# Patient Record
Sex: MALE | Race: WHITE | Employment: OTHER | URBAN - NONMETROPOLITAN AREA
[De-identification: names, ages, dates, MRNs, and addresses within clinical notes are randomized per-mention and may not be internally consistent; named-entity substitution may affect disease eponyms.]

---

## 2017-02-06 LAB — HBA1C MFR BLD: 7.7 %

## 2017-02-07 LAB
ALBUMIN SERPL-MCNC: 3.3 G/DL
ALP BLD-CCNC: 58 U/L
ALT SERPL-CCNC: 25 U/L
AST SERPL-CCNC: 20 U/L
BASOPHILS ABSOLUTE: ABNORMAL /ΜL
BASOPHILS RELATIVE PERCENT: ABNORMAL %
BILIRUB SERPL-MCNC: 0.7 MG/DL (ref 0.1–1.4)
BUN BLDV-MCNC: 18 MG/DL
CALCIUM SERPL-MCNC: NORMAL MG/DL
CHLORIDE BLD-SCNC: 102 MMOL/L
CHOLESTEROL, TOTAL: 96 MG/DL
CHOLESTEROL/HDL RATIO: NORMAL
CO2: NORMAL MMOL/L
CREAT SERPL-MCNC: 1.2 MG/DL
EOSINOPHILS ABSOLUTE: ABNORMAL /ΜL
EOSINOPHILS RELATIVE PERCENT: ABNORMAL %
GFR CALCULATED: NORMAL
GLUCOSE BLD-MCNC: 180 MG/DL
HCT VFR BLD CALC: 35.8 % (ref 41–53)
HDLC SERPL-MCNC: 52 MG/DL (ref 35–70)
HEMOGLOBIN: 12.2 G/DL (ref 13.5–17.5)
LDL CHOLESTEROL CALCULATED: 34 MG/DL (ref 0–160)
LYMPHOCYTES ABSOLUTE: ABNORMAL /ΜL
LYMPHOCYTES RELATIVE PERCENT: ABNORMAL %
MCH RBC QN AUTO: ABNORMAL PG
MCHC RBC AUTO-ENTMCNC: ABNORMAL G/DL
MCV RBC AUTO: ABNORMAL FL
MONOCYTES ABSOLUTE: ABNORMAL /ΜL
MONOCYTES RELATIVE PERCENT: ABNORMAL %
NEUTROPHILS ABSOLUTE: ABNORMAL /ΜL
NEUTROPHILS RELATIVE PERCENT: ABNORMAL %
PLATELET # BLD: 171 K/ΜL
PMV BLD AUTO: ABNORMAL FL
POTASSIUM SERPL-SCNC: 4.8 MMOL/L
RBC # BLD: 3.68 10^6/ΜL
SODIUM BLD-SCNC: 137 MMOL/L
TOTAL PROTEIN: NORMAL
TRIGL SERPL-MCNC: 52 MG/DL
TSH SERPL DL<=0.05 MIU/L-ACNC: 2.11 UIU/ML
VLDLC SERPL CALC-MCNC: NORMAL MG/DL
WBC # BLD: 7.1 10^3/ML

## 2017-03-27 ENCOUNTER — OFFICE VISIT (OUTPATIENT)
Dept: CARDIOLOGY CLINIC | Age: 72
End: 2017-03-27

## 2017-03-27 VITALS
HEIGHT: 67 IN | BODY MASS INDEX: 35.47 KG/M2 | DIASTOLIC BLOOD PRESSURE: 58 MMHG | HEART RATE: 64 BPM | WEIGHT: 226 LBS | RESPIRATION RATE: 16 BRPM | SYSTOLIC BLOOD PRESSURE: 120 MMHG

## 2017-03-27 DIAGNOSIS — J98.6 DIAPHRAGM PARALYSIS: ICD-10-CM

## 2017-03-27 DIAGNOSIS — Z95.1 S/P CABG (CORONARY ARTERY BYPASS GRAFT): Primary | ICD-10-CM

## 2017-03-27 DIAGNOSIS — E11.40 TYPE 2 DIABETES MELLITUS WITH DIABETIC NEUROPATHY, WITH LONG-TERM CURRENT USE OF INSULIN (HCC): ICD-10-CM

## 2017-03-27 DIAGNOSIS — Z79.4 TYPE 2 DIABETES MELLITUS WITH DIABETIC NEUROPATHY, WITH LONG-TERM CURRENT USE OF INSULIN (HCC): ICD-10-CM

## 2017-03-27 DIAGNOSIS — E78.2 MIXED HYPERLIPIDEMIA: ICD-10-CM

## 2017-03-27 DIAGNOSIS — I10 ESSENTIAL HYPERTENSION: ICD-10-CM

## 2017-03-27 PROCEDURE — 99214 OFFICE O/P EST MOD 30 MIN: CPT | Performed by: INTERNAL MEDICINE

## 2017-03-27 RX ORDER — CARVEDILOL 12.5 MG/1
12.5 TABLET ORAL 2 TIMES DAILY WITH MEALS
Qty: 180 TABLET | Refills: 3 | Status: SHIPPED | OUTPATIENT
Start: 2017-03-27 | End: 2018-04-02 | Stop reason: SDUPTHER

## 2017-03-27 RX ORDER — SPIRONOLACTONE 25 MG
TABLET ORAL
COMMUNITY

## 2017-03-27 RX ORDER — IBUPROFEN 200 MG
400 TABLET ORAL PRN
COMMUNITY
End: 2017-10-02 | Stop reason: ALTCHOICE

## 2017-03-27 RX ORDER — M-VIT,TX,IRON,MINS/CALC/FOLIC 27MG-0.4MG
1 TABLET ORAL DAILY
COMMUNITY

## 2017-03-30 ENCOUNTER — OFFICE VISIT (OUTPATIENT)
Dept: PULMONOLOGY | Age: 72
End: 2017-03-30

## 2017-03-30 VITALS
DIASTOLIC BLOOD PRESSURE: 50 MMHG | WEIGHT: 224 LBS | BODY MASS INDEX: 35.16 KG/M2 | HEART RATE: 62 BPM | OXYGEN SATURATION: 97 % | SYSTOLIC BLOOD PRESSURE: 120 MMHG | HEIGHT: 67 IN | RESPIRATION RATE: 18 BRPM

## 2017-03-30 DIAGNOSIS — G47.30 SLEEP APNEA, UNSPECIFIED TYPE: ICD-10-CM

## 2017-03-30 DIAGNOSIS — J44.9 COPD, MILD (HCC): Primary | ICD-10-CM

## 2017-03-30 DIAGNOSIS — J98.6 DIAPHRAGM PARALYSIS: ICD-10-CM

## 2017-03-30 PROCEDURE — 99213 OFFICE O/P EST LOW 20 MIN: CPT | Performed by: INTERNAL MEDICINE

## 2017-05-08 ENCOUNTER — NURSE ONLY (OUTPATIENT)
Dept: PULMONOLOGY | Age: 72
End: 2017-05-08

## 2017-05-08 DIAGNOSIS — J44.9 COPD, MILD (HCC): ICD-10-CM

## 2017-05-08 DIAGNOSIS — J98.6 DIAPHRAGM PARALYSIS: ICD-10-CM

## 2017-05-08 LAB
DLCO %PRED: NORMAL
DLCO PRE: NORMAL
FEF 25-75%-POST: 0.85
FEF 25-75%-PRE: 0.82
FEV1-POST: 1.34
FEV1-PRE: 1.22
FEV1/FVC-POST: 72.9
FEV1/FVC-PRE: 71.2
FVC-POST: 1.92
FVC-PRE: 1.71
MEP: NORMAL
MIP: NORMAL
TLC %PRED: NORMAL
TLC PRE: NORMAL

## 2017-05-08 PROCEDURE — 94060 EVALUATION OF WHEEZING: CPT | Performed by: INTERNAL MEDICINE

## 2017-05-08 ASSESSMENT — PULMONARY FUNCTION TESTS
FVC_POST: 1.92
FEV1/FVC_POST: 72.9
FVC_PRE: 1.71
FEV1_POST: 1.34
FEV1/FVC_PRE: 71.2
FEV1_PRE: 1.22

## 2017-10-02 ENCOUNTER — OFFICE VISIT (OUTPATIENT)
Dept: CARDIOLOGY CLINIC | Age: 72
End: 2017-10-02

## 2017-10-02 VITALS
RESPIRATION RATE: 16 BRPM | HEART RATE: 60 BPM | DIASTOLIC BLOOD PRESSURE: 66 MMHG | BODY MASS INDEX: 35.16 KG/M2 | HEIGHT: 67 IN | SYSTOLIC BLOOD PRESSURE: 124 MMHG | WEIGHT: 224 LBS

## 2017-10-02 DIAGNOSIS — E11.40 TYPE 2 DIABETES MELLITUS WITH DIABETIC NEUROPATHY, WITH LONG-TERM CURRENT USE OF INSULIN (HCC): ICD-10-CM

## 2017-10-02 DIAGNOSIS — I10 ESSENTIAL HYPERTENSION: ICD-10-CM

## 2017-10-02 DIAGNOSIS — E78.2 MIXED HYPERLIPIDEMIA: ICD-10-CM

## 2017-10-02 DIAGNOSIS — M79.89 LEG SWELLING: ICD-10-CM

## 2017-10-02 DIAGNOSIS — J98.6 DIAPHRAGM PARALYSIS: ICD-10-CM

## 2017-10-02 DIAGNOSIS — G47.30 SLEEP APNEA, UNSPECIFIED TYPE: ICD-10-CM

## 2017-10-02 DIAGNOSIS — Z95.1 S/P CABG (CORONARY ARTERY BYPASS GRAFT): Primary | ICD-10-CM

## 2017-10-02 DIAGNOSIS — Z79.4 TYPE 2 DIABETES MELLITUS WITH DIABETIC NEUROPATHY, WITH LONG-TERM CURRENT USE OF INSULIN (HCC): ICD-10-CM

## 2017-10-02 PROCEDURE — 99214 OFFICE O/P EST MOD 30 MIN: CPT | Performed by: INTERNAL MEDICINE

## 2017-10-02 NOTE — PROGRESS NOTES
Maria Fernanda Nye  1945  [place]      Chief complaint and HPI:  Maria Fernanda Nye  is a 58-year-old male follows up for known coronary artery disease status post remote coronary artery bypass surgery, hypertension, hyperlipidemia and diaphragmatic paralysis. He denies any chest pain. His shortness of breath on exertion has been stable. He keeps himself fairly active and walks for exercise and has been very compliant to his medications. He does follow-up check his sugar regularly and follows up with primary care physician at [facility] for this. he denied palpitations, lightheadedness, orthopnea or paroxysmal nocturnal dyspnea. He has noticed more leg swelling on the left side which goes down after he rests. Rest of the Cardiovascular system review is otherwise unchanged from prior encounter. Past medical history:  has a past medical history of Anemia; Asthma; CAD (coronary artery disease); Cardiomyopathy ([address] Cataract; COPD, mild ([address] Diabetes mellitus ([address] Diabetic neuropathy ([address] Diaphragmatic paralysis; H/O cardiovascular stress test; H/O cardiovascular stress test; H/O chest x-ray; H/O echocardiogram; History of cardiac cath; Hyperlipidemia; Leg swelling; Median nerve compression; MI (myocardial infarction) ([address] Mononeuritis multiplex; Retinal hemorrhage; S/P CABG x 4; Sleep apnea; and Sleep apnea. Past surgical history:  has a past surgical history that includes Carpal tunnel release; Finger trigger release; Cataract removal; Abdomen surgery; Appendectomy; and Coronary artery bypass graft (2004). Social History:   Social History   Substance Use Topics    Smoking status: Never Smoker    Smokeless tobacco: Never Used    Alcohol use No     Family history: family history includes Cancer in his mother; Diabetes in his paternal aunt and sister. ALLERGIES:  Lisinopril  Prior to Admission medications    Medication Sig Start Date End Date Taking?  Authorizing Provider   Multiple changes for cardiovascular risk reduction. Various goals are discussed and questions answered. Appropriate prescriptions if needed on this visit are addressed. After visit summery is provided. Questions answered and patient verbalizes understanding. Follow up in office 6 months sooner if needed.     Jen Campbell MD, 10/2/2017 12:09 PM

## 2017-10-02 NOTE — MR AVS SNAPSHOT
estimate of body fat, calculated from your height and weight. The higher your BMI, the greater your risk of heart disease, high blood pressure, type 2 diabetes, stroke, gallstones, arthritis, sleep apnea, and certain cancers. BMI is not perfect. It may overestimate body fat in athletes and people who are more muscular. Even a small weight loss (between 5 and 10 percent of your current weight) by decreasing your calorie intake and becoming more physically active will help lower your risk of developing or worsening diseases associated with obesity. Learn more at: [email or web address]             Today's Medication Changes          These changes are accurate as of: 10/2/17 12:11 PM.  If you have any questions, ask your nurse or doctor. STOP taking these medications           ibuprofen 200 MG tablet   Commonly known as:  ADVIL;MOTRIN   Stopped by: Amita William MD               Your Current Medications Are              Multiple Vitamins-Minerals (THERAPEUTIC MULTIVITAMIN-MINERALS) tablet Take 1 tablet by mouth daily    Lutein 20 MG CAPS Take by mouth    carvedilol (COREG) 12.5 MG tablet Take 1 tablet by mouth 2 times daily (with meals)    Albuterol Sulfate (PROAIR RESPICLICK IN) Inhale into the lungs as needed    ascorbic acid (VITAMIN C) 500 MG tablet Take 500 mg by mouth daily    Coenzyme Q10 (CO Q 10 PO) Take 200 mg by mouth daily. levothyroxine (SYNTHROID) 88 MCG tablet Take 88 mcg by mouth Daily. simvastatin (ZOCOR) 40 MG tablet Take 1/2 tablet daily at bedtime. metformin (GLUCOPHAGE) 1000 MG tablet Take 1,000 mg by mouth 2 times daily (with meals). insulin aspart (NOVOLOG) 100 UNIT/ML injection Inject 4 Units into the skin 3 times daily (before meals)     insulin glargine (LANTUS) 100 UNIT/ML injection Inject  into the skin daily. Indications: 36 units daily    aspirin 81 MG chewable tablet Take 81 mg by mouth daily. losartan (COZAAR) 25 MG tablet Take 25 mg by mouth daily. Allergies              Lisinopril Other (See Comments)    cough         Additional Information        Basic Information     Date Of Birth Sex Race Ethnicity Preferred Language    1945 Male White Non-/Non  English      Problem List as of 10/2/2017  Date Reviewed: 10/2/2017                CAD (coronary artery disease)    Leg swelling    Sleep apnea    Mononeuritis multiplex    Essential hypertension    COPD, mild (HCC)    Diabetic neuropathy [address]    Diabetes mellitus ([facility]    Diaphragm paralysis    Hyperlipidemia    S/P CABG (coronary artery bypass graft)    Obesity (BMI 30.0-34. 9)      Preventive Care        Date Due    Diabetic Foot Exam 5/5/1954    Eye Exam By An Eye Doctor 5/5/1954    Urine Check For Kidney Problems 5/5/1962    Tetanus Combination Vaccine (1 - Tdap) 5/5/1963    Colonoscopy 5/5/1994    Zoster Vaccine 5/5/2004    Pneumococcal Vaccines (two) for all adults aged 72 and over (1 of 2 - PCV13) 5/5/2009    Yearly Flu Vaccine (1) 9/1/2017    Hemoglobin A1C (Test For Long-Term Glucose Control) 2/6/2018    Cholesterol Screening 2/7/2018            [employer] Signup           Our records indicate that you have an active [employer] account. You can view your After Visit Summary by going to [email or web address] and logging in with your [employer] username and password. If you don't have a [employer] username and password but a parent or guardian has access to your record, the parent or guardian should login with their own [employer] username and password and access your record to view the After Visit Summary. Additional Information  If you have questions, please contact the physician practice where you receive care. Remember, [employer] is NOT to be used for urgent needs. For medical emergencies, dial 911. For questions regarding your [employer] account call [phone].  If you have a clinical question, please call your doctor's office.

## 2017-11-16 LAB
ALBUMIN SERPL-MCNC: 3.6 G/DL
ALP BLD-CCNC: 60 U/L
ALT SERPL-CCNC: 30 U/L
ANION GAP SERPL CALCULATED.3IONS-SCNC: NORMAL MMOL/L
AST SERPL-CCNC: 18 U/L
AVERAGE GLUCOSE: NORMAL
BASOPHILS ABSOLUTE: ABNORMAL /ΜL
BASOPHILS RELATIVE PERCENT: ABNORMAL %
BILIRUB SERPL-MCNC: 0.5 MG/DL (ref 0.1–1.4)
BUN BLDV-MCNC: 17 MG/DL
CALCIUM SERPL-MCNC: NORMAL MG/DL
CHLORIDE BLD-SCNC: 107 MMOL/L
CHOLESTEROL, TOTAL: 96 MG/DL
CHOLESTEROL/HDL RATIO: NORMAL
CO2: NORMAL MMOL/L
CREAT SERPL-MCNC: 1 MG/DL
EOSINOPHILS ABSOLUTE: ABNORMAL /ΜL
EOSINOPHILS RELATIVE PERCENT: ABNORMAL %
GFR CALCULATED: NORMAL
GLUCOSE BLD-MCNC: 100 MG/DL
HBA1C MFR BLD: 7.4 %
HCT VFR BLD CALC: 36 % (ref 41–53)
HDLC SERPL-MCNC: 47 MG/DL (ref 35–70)
HEMOGLOBIN: 11.5 G/DL (ref 13.5–17.5)
LDL CHOLESTEROL CALCULATED: 38 MG/DL (ref 0–160)
LYMPHOCYTES ABSOLUTE: ABNORMAL /ΜL
LYMPHOCYTES RELATIVE PERCENT: ABNORMAL %
MCH RBC QN AUTO: ABNORMAL PG
MCHC RBC AUTO-ENTMCNC: ABNORMAL G/DL
MCV RBC AUTO: ABNORMAL FL
MONOCYTES ABSOLUTE: ABNORMAL /ΜL
MONOCYTES RELATIVE PERCENT: ABNORMAL %
NEUTROPHILS ABSOLUTE: ABNORMAL /ΜL
NEUTROPHILS RELATIVE PERCENT: ABNORMAL %
PLATELET # BLD: 172 K/ΜL
PMV BLD AUTO: ABNORMAL FL
POTASSIUM SERPL-SCNC: 4.5 MMOL/L
RBC # BLD: 3.57 10^6/ΜL
SODIUM BLD-SCNC: 140 MMOL/L
TOTAL PROTEIN: NORMAL
TRIGL SERPL-MCNC: 53 MG/DL
TSH SERPL DL<=0.05 MIU/L-ACNC: 1.42 UIU/ML
VLDLC SERPL CALC-MCNC: NORMAL MG/DL
WBC # BLD: 5.7 10^3/ML

## 2018-03-08 ENCOUNTER — OFFICE VISIT (OUTPATIENT)
Dept: PULMONOLOGY | Age: 73
End: 2018-03-08

## 2018-03-08 VITALS
HEART RATE: 60 BPM | OXYGEN SATURATION: 98 % | DIASTOLIC BLOOD PRESSURE: 72 MMHG | BODY MASS INDEX: 35.08 KG/M2 | SYSTOLIC BLOOD PRESSURE: 126 MMHG | WEIGHT: 223.99 LBS

## 2018-03-08 DIAGNOSIS — J44.9 COPD, MILD (HCC): ICD-10-CM

## 2018-03-08 DIAGNOSIS — J98.6 DIAPHRAGM PARALYSIS: ICD-10-CM

## 2018-03-08 DIAGNOSIS — G47.33 OBSTRUCTIVE SLEEP APNEA SYNDROME: Primary | ICD-10-CM

## 2018-03-08 PROCEDURE — 99213 OFFICE O/P EST LOW 20 MIN: CPT | Performed by: INTERNAL MEDICINE

## 2018-04-02 ENCOUNTER — OFFICE VISIT (OUTPATIENT)
Dept: CARDIOLOGY CLINIC | Age: 73
End: 2018-04-02

## 2018-04-02 VITALS
BODY MASS INDEX: 34.84 KG/M2 | WEIGHT: 222 LBS | SYSTOLIC BLOOD PRESSURE: 108 MMHG | HEIGHT: 67 IN | DIASTOLIC BLOOD PRESSURE: 58 MMHG | HEART RATE: 64 BPM | RESPIRATION RATE: 16 BRPM

## 2018-04-02 DIAGNOSIS — Z79.4 TYPE 2 DIABETES MELLITUS WITH DIABETIC NEUROPATHY, WITH LONG-TERM CURRENT USE OF INSULIN (HCC): ICD-10-CM

## 2018-04-02 DIAGNOSIS — G47.33 OBSTRUCTIVE SLEEP APNEA SYNDROME: ICD-10-CM

## 2018-04-02 DIAGNOSIS — Z95.1 S/P CABG (CORONARY ARTERY BYPASS GRAFT): Primary | ICD-10-CM

## 2018-04-02 DIAGNOSIS — E78.2 MIXED HYPERLIPIDEMIA: ICD-10-CM

## 2018-04-02 DIAGNOSIS — E11.40 TYPE 2 DIABETES MELLITUS WITH DIABETIC NEUROPATHY, WITH LONG-TERM CURRENT USE OF INSULIN (HCC): ICD-10-CM

## 2018-04-02 DIAGNOSIS — I10 ESSENTIAL HYPERTENSION: ICD-10-CM

## 2018-04-02 PROCEDURE — 99214 OFFICE O/P EST MOD 30 MIN: CPT | Performed by: INTERNAL MEDICINE

## 2018-04-02 RX ORDER — CARVEDILOL 12.5 MG/1
12.5 TABLET ORAL 2 TIMES DAILY WITH MEALS
Qty: 180 TABLET | Refills: 3 | Status: SHIPPED | OUTPATIENT
Start: 2018-04-02 | End: 2019-04-29 | Stop reason: SDUPTHER

## 2018-05-10 ENCOUNTER — NURSE ONLY (OUTPATIENT)
Dept: PULMONOLOGY | Age: 73
End: 2018-05-10

## 2018-05-10 DIAGNOSIS — J44.9 COPD, MILD (HCC): ICD-10-CM

## 2018-05-10 LAB
DLCO %PRED: NORMAL
DLCO PRE: NORMAL
FEF 25-75%-POST: 0.94
FEF 25-75%-PRE: 0.9
FEV1-POST: 1.39
FEV1-PRE: 1.26
FEV1/FVC-POST: 71.7
FEV1/FVC-PRE: 73.1
FVC-POST: 1.93
FVC-PRE: 1.73
MEP: NORMAL
MIP: NORMAL
TLC %PRED: NORMAL
TLC PRE: NORMAL

## 2018-05-10 PROCEDURE — 94060 EVALUATION OF WHEEZING: CPT | Performed by: INTERNAL MEDICINE

## 2018-05-10 ASSESSMENT — PULMONARY FUNCTION TESTS
FEV1/FVC_POST: 71.7
FEV1_POST: 1.39
FEV1_PRE: 1.26
FEV1/FVC_PRE: 73.1
FVC_POST: 1.93
FVC_PRE: 1.73

## 2018-10-01 ENCOUNTER — OFFICE VISIT (OUTPATIENT)
Dept: CARDIOLOGY CLINIC | Age: 73
End: 2018-10-01
Payer: MEDICARE

## 2018-10-01 VITALS
DIASTOLIC BLOOD PRESSURE: 60 MMHG | WEIGHT: 226.2 LBS | BODY MASS INDEX: 35.5 KG/M2 | HEART RATE: 64 BPM | SYSTOLIC BLOOD PRESSURE: 134 MMHG | HEIGHT: 67 IN

## 2018-10-01 DIAGNOSIS — G47.33 OBSTRUCTIVE SLEEP APNEA SYNDROME: ICD-10-CM

## 2018-10-01 DIAGNOSIS — Z95.1 S/P CABG (CORONARY ARTERY BYPASS GRAFT): Primary | ICD-10-CM

## 2018-10-01 DIAGNOSIS — E78.2 MIXED HYPERLIPIDEMIA: ICD-10-CM

## 2018-10-01 DIAGNOSIS — I10 ESSENTIAL HYPERTENSION: ICD-10-CM

## 2018-10-01 DIAGNOSIS — Z79.4 DIABETES MELLITUS DUE TO UNDERLYING CONDITION WITH HYPEROSMOLARITY WITHOUT COMA, WITH LONG-TERM CURRENT USE OF INSULIN (HCC): ICD-10-CM

## 2018-10-01 DIAGNOSIS — E08.00 DIABETES MELLITUS DUE TO UNDERLYING CONDITION WITH HYPEROSMOLARITY WITHOUT COMA, WITH LONG-TERM CURRENT USE OF INSULIN (HCC): ICD-10-CM

## 2018-10-01 PROCEDURE — 99214 OFFICE O/P EST MOD 30 MIN: CPT | Performed by: INTERNAL MEDICINE

## 2018-10-01 NOTE — ASSESSMENT & PLAN NOTE
Follows up at [address] [facility]. Apparently blood sugar has been fairly well controlled.

## 2019-01-09 LAB
ALBUMIN SERPL-MCNC: 3.5 G/DL
ALP BLD-CCNC: 56 U/L
ALT SERPL-CCNC: 22 U/L
ANION GAP SERPL CALCULATED.3IONS-SCNC: NORMAL MMOL/L
AST SERPL-CCNC: 13 U/L
BASOPHILS ABSOLUTE: ABNORMAL /ΜL
BASOPHILS RELATIVE PERCENT: ABNORMAL %
BILIRUB SERPL-MCNC: 0.5 MG/DL (ref 0.1–1.4)
BUN BLDV-MCNC: 18 MG/DL
CALCIUM SERPL-MCNC: NORMAL MG/DL
CHLORIDE BLD-SCNC: 104 MMOL/L
CHOLESTEROL, TOTAL: 81 MG/DL
CHOLESTEROL/HDL RATIO: NORMAL
CO2: NORMAL MMOL/L
CREAT SERPL-MCNC: 1.1 MG/DL
EOSINOPHILS ABSOLUTE: ABNORMAL /ΜL
EOSINOPHILS RELATIVE PERCENT: ABNORMAL %
GFR CALCULATED: NORMAL
GLUCOSE BLD-MCNC: 145 MG/DL
HCT VFR BLD CALC: 34.6 % (ref 41–53)
HDLC SERPL-MCNC: 41 MG/DL (ref 35–70)
HEMOGLOBIN: 11.9 G/DL (ref 13.5–17.5)
LDL CHOLESTEROL CALCULATED: 31 MG/DL (ref 0–160)
LYMPHOCYTES ABSOLUTE: ABNORMAL /ΜL
LYMPHOCYTES RELATIVE PERCENT: ABNORMAL %
MCH RBC QN AUTO: ABNORMAL PG
MCHC RBC AUTO-ENTMCNC: ABNORMAL G/DL
MCV RBC AUTO: ABNORMAL FL
MONOCYTES ABSOLUTE: ABNORMAL /ΜL
MONOCYTES RELATIVE PERCENT: ABNORMAL %
NEUTROPHILS ABSOLUTE: ABNORMAL /ΜL
NEUTROPHILS RELATIVE PERCENT: ABNORMAL %
PLATELET # BLD: 156 K/ΜL
PMV BLD AUTO: ABNORMAL FL
POTASSIUM SERPL-SCNC: 4.8 MMOL/L
RBC # BLD: 3.51 10^6/ΜL
SODIUM BLD-SCNC: 138 MMOL/L
TOTAL PROTEIN: NORMAL
TRIGL SERPL-MCNC: 43 MG/DL
VLDLC SERPL CALC-MCNC: NORMAL MG/DL
WBC # BLD: 6.9 10^3/ML

## 2019-03-07 ENCOUNTER — OFFICE VISIT (OUTPATIENT)
Dept: PULMONOLOGY | Age: 74
End: 2019-03-07
Payer: MEDICARE

## 2019-03-07 ENCOUNTER — HOSPITAL ENCOUNTER (OUTPATIENT)
Age: 74
Discharge: HOME | End: 2019-03-07
Payer: MEDICARE

## 2019-03-07 ENCOUNTER — TELEPHONE (OUTPATIENT)
Dept: PULMONOLOGY | Age: 74
End: 2019-03-07

## 2019-03-07 ENCOUNTER — HOSPITAL ENCOUNTER (OUTPATIENT)
Dept: GENERAL RADIOLOGY | Age: 74
Discharge: HOME | End: 2019-03-07
Payer: MEDICARE

## 2019-03-07 VITALS
OXYGEN SATURATION: 98 % | DIASTOLIC BLOOD PRESSURE: 60 MMHG | BODY MASS INDEX: 34.36 KG/M2 | SYSTOLIC BLOOD PRESSURE: 116 MMHG | HEART RATE: 57 BPM | WEIGHT: 219.4 LBS

## 2019-03-07 DIAGNOSIS — J98.6 DIAPHRAGM PARALYSIS: ICD-10-CM

## 2019-03-07 DIAGNOSIS — G47.33 OBSTRUCTIVE SLEEP APNEA SYNDROME: Primary | ICD-10-CM

## 2019-03-07 DIAGNOSIS — J44.9 COPD, MILD (HCC): ICD-10-CM

## 2019-03-07 PROCEDURE — 99213 OFFICE O/P EST LOW 20 MIN: CPT | Performed by: INTERNAL MEDICINE

## 2019-03-07 PROCEDURE — 71046 X-RAY EXAM CHEST 2 VIEWS: CPT

## 2019-04-29 ENCOUNTER — OFFICE VISIT (OUTPATIENT)
Dept: CARDIOLOGY CLINIC | Age: 74
End: 2019-04-29
Payer: MEDICARE

## 2019-04-29 VITALS
WEIGHT: 219 LBS | DIASTOLIC BLOOD PRESSURE: 58 MMHG | HEIGHT: 67 IN | HEART RATE: 60 BPM | SYSTOLIC BLOOD PRESSURE: 120 MMHG | BODY MASS INDEX: 34.37 KG/M2

## 2019-04-29 DIAGNOSIS — Z79.4 DIABETES MELLITUS DUE TO UNDERLYING CONDITION WITH HYPEROSMOLARITY WITHOUT COMA, WITH LONG-TERM CURRENT USE OF INSULIN (HCC): ICD-10-CM

## 2019-04-29 DIAGNOSIS — R06.02 SOB (SHORTNESS OF BREATH) ON EXERTION: ICD-10-CM

## 2019-04-29 DIAGNOSIS — I10 ESSENTIAL HYPERTENSION: ICD-10-CM

## 2019-04-29 DIAGNOSIS — I25.810 CORONARY ARTERY DISEASE INVOLVING OTHER CORONARY ARTERY BYPASS GRAFT WITHOUT ANGINA PECTORIS: Primary | ICD-10-CM

## 2019-04-29 DIAGNOSIS — E78.2 MIXED HYPERLIPIDEMIA: ICD-10-CM

## 2019-04-29 DIAGNOSIS — Z95.1 S/P CABG (CORONARY ARTERY BYPASS GRAFT): ICD-10-CM

## 2019-04-29 DIAGNOSIS — E08.00 DIABETES MELLITUS DUE TO UNDERLYING CONDITION WITH HYPEROSMOLARITY WITHOUT COMA, WITH LONG-TERM CURRENT USE OF INSULIN (HCC): ICD-10-CM

## 2019-04-29 PROCEDURE — 93000 ELECTROCARDIOGRAM COMPLETE: CPT | Performed by: INTERNAL MEDICINE

## 2019-04-29 PROCEDURE — 99214 OFFICE O/P EST MOD 30 MIN: CPT | Performed by: INTERNAL MEDICINE

## 2019-04-29 RX ORDER — CARVEDILOL 12.5 MG/1
12.5 TABLET ORAL 2 TIMES DAILY WITH MEALS
Qty: 180 TABLET | Refills: 3 | Status: CANCELLED | OUTPATIENT
Start: 2019-04-29

## 2019-04-29 RX ORDER — CARVEDILOL 12.5 MG/1
12.5 TABLET ORAL 2 TIMES DAILY WITH MEALS
Qty: 180 TABLET | Refills: 3 | Status: SHIPPED | OUTPATIENT
Start: 2019-04-29 | End: 2020-05-18 | Stop reason: SDUPTHER

## 2019-04-29 NOTE — PATIENT INSTRUCTIONS
Continue current cardiovascular medications which have been reviewed and discussed individually with you. Stress Cardiolite on current medications. Primary/secondary prevention is the goal by aggressive risk modification, healthy and therapeutic life style changes for cardiovascular risk reduction. Various goals are discussed and questions answered. Appropriate prescriptions if needed on this visit are addressed. After visit summery is provided. Questions answered and patient verbalizes understanding. Follow up in office in 6 months, sooner if needed.

## 2019-04-29 NOTE — PROGRESS NOTES
Bhavik Goodson  1945  [facility]    Chief complaint and HPI:  Bhavik Goodson  75years old male follos up for CAD s/p CABG,    HTN and hyperlipidemia. He has DM, COPD and diaphragmatic palsy post CABG. Patient denies cp, sob, dizziness, palp and edema. Medications, hx and labs reviewed with patient. Rest of the Cardiovascular system review is otherwise unchanged from prior encounter. Past medical history:  has a past medical history of Anemia, Asthma, CAD (coronary artery disease), Cardiomyopathy ([address] Cataract, COPD, mild ([address] Diabetes mellitus ([address] Diabetic neuropathy ([address] Diaphragmatic paralysis, H/O cardiovascular stress test, H/O cardiovascular stress test, H/O chest x-ray, H/O echocardiogram, History of cardiac cath, Hyperlipidemia, Leg swelling, Median nerve compression, MI (myocardial infarction) ([address] Mononeuritis multiplex, Retinal hemorrhage, S/P CABG x 4, Sleep apnea, and SOB (shortness of breath) on exertion. Past surgical history:  has a past surgical history that includes Carpal tunnel release; Finger trigger release; Cataract removal; Abdomen surgery; Appendectomy; and Coronary artery bypass graft (2004). Social History:   Social History     Tobacco Use    Smoking status: Never Smoker    Smokeless tobacco: Never Used   Substance Use Topics    Alcohol use: No     Alcohol/week: 0.0 oz     Family history: family history includes Cancer in his mother; Diabetes in his paternal aunt and sister. ALLERGIES:  Lisinopril  Prior to Admission medications    Medication Sig Start Date End Date Taking?  Authorizing Provider   carvedilol (COREG) 12.5 MG tablet Take 1 tablet by mouth 2 times daily (with meals) 4/29/19  Yes Savanah Sevilla MD   albuterol sulfate (PROAIR RESPICLICK) 584 (90 Base) MCG/ACT aerosol powder inhalation Inhale 2 puffs into the lungs every 4 hours as needed for Wheezing or Shortness of Breath 3/7/19  Yes Anthony Guerra MD   Cyanocobalamin (VITAMIN B12 PO) Take 2,500 mcg by mouth   Yes Historical Provider, MD   Multiple Vitamins-Minerals (THERAPEUTIC MULTIVITAMIN-MINERALS) tablet Take 1 tablet by mouth daily   Yes Historical Provider, MD   Lutein 20 MG CAPS Take by mouth   Yes Historical Provider, MD   Albuterol Sulfate (PROAIR RESPICLICK IN) Inhale into the lungs as needed   Yes Historical Provider, MD   ascorbic acid (VITAMIN C) 500 MG tablet Take 500 mg by mouth daily   Yes Historical Provider, MD   Coenzyme Q10 (CO Q 10 PO) Take 200 mg by mouth daily. Yes Historical Provider, MD   levothyroxine (SYNTHROID) 88 MCG tablet Take 88 mcg by mouth Daily. Yes Historical Provider, MD   simvastatin (ZOCOR) 40 MG tablet Take 1/2 tablet daily at bedtime. Yes Historical Provider, MD   metformin (GLUCOPHAGE) 1000 MG tablet Take 1,000 mg by mouth 2 times daily (with meals). Yes Historical Provider, MD   insulin aspart (NOVOLOG) 100 UNIT/ML injection Inject 4 Units into the skin 3 times daily (before meals)    Yes Historical Provider, MD   insulin glargine (LANTUS) 100 UNIT/ML injection Inject  into the skin daily. Indications: 36 units daily   Yes Historical Provider, MD   aspirin 81 MG chewable tablet Take 81 mg by mouth daily. Yes Historical Provider, MD   losartan (COZAAR) 25 MG tablet Take 25 mg by mouth daily. Yes Historical Provider, MD     Vitals:    04/29/19 1541   BP: (!) 120/58   Pulse: 60   Weight: 219 lb (99.3 kg)   Height: 5' 7\" (1.702 m)      Body mass index is 34.3 kg/m². Wt Readings from Last 3 Encounters:   04/29/19 219 lb (99.3 kg)   03/07/19 219 lb 6.4 oz (99.5 kg)   10/01/18 226 lb 3.2 oz (102.6 kg)     Constitutional: Moderately obese pleasant male in no apparent distress. Lost 7 pounds. Eyes: He wears glasses pupils are equal.  ENT: Unremarkable  NECK: No JVP or thyromegaly  Cardiovascular:  Auscultation: Normal S1 and S2.  No murmurs or gallops noted.   Carotids are negative for bruits.  Abdominal aorta is normal.  No epigastric bruit noted.  Peripheral pulses: 1+ equal  Respiratory:  Respiratory effort is normal  Breath sounds are diminished in the right lower chest.  Extremities: Has bilateral 1-2+ pitting edema of leg worse on the left than on the right. No clubbing,  Cyanosis, petechiae. SKIN: Warm and well perfused, no pallor or cyanosis  Abdomen:  No masses or tenderness. No organomegaly noted. Musculoskeletal:  No spinal deformities noted.  Gait is normal  Muscle strength is normal  Neurologic:  Oriented to time, place, and person and non-anxious. No focal neurological deficit noted. Psychiatric: Normal mood and effect. ECG today is c/w sinus rhythm 60 bpm. Old AWMI. T wave inversion in precordial leads new since 2014 ECG    LAB REVIEW:    CBC:   Lab Results   Component Value Date    WBC 5.7 11/16/2017    HGB 11.5 11/16/2017    HCT 36.0 11/16/2017     11/16/2017     Lipids:   Lab Results   Component Value Date    CHOL 96 11/16/2017    TRIG 53 11/16/2017    HDL 47 11/16/2017    LDLCALC 38 11/16/2017     Renal:   Lab Results   Component Value Date    BUN 17 11/16/2017    CREATININE 1.0 11/16/2017     11/16/2017    K 4.5 11/16/2017     PT/INR:   Lab Results   Component Value Date    INR 0.93 04/18/2012     IMPRESSION and RECOMMENDATIONS:      S/P CABG (coronary artery bypass graft)  Stress Cardiolite on medications to evaluate CAD status. Hyperlipidemia  Well controlled on current medications, reviewed individually with patient. Essential hypertension  Well controlled on current medications, reviewed individually with patient. Diabetes mellitus ([address]  Well controled. recent Hb A1c 7.2    SOB (shortness of breath) on exertion  ?angina equivalent    Continue current cardiovascular medications which have been reviewed and discussed individually with you. Stress Cardiolite on current medications.  Primary/secondary prevention is the goal by aggressive risk modification, healthy and therapeutic life style changes

## 2019-05-09 ENCOUNTER — NURSE ONLY (OUTPATIENT)
Dept: PULMONOLOGY | Age: 74
End: 2019-05-09
Payer: MEDICARE

## 2019-05-09 DIAGNOSIS — J44.9 COPD, MILD (HCC): Primary | ICD-10-CM

## 2019-05-09 DIAGNOSIS — J44.9 COPD, MILD (HCC): ICD-10-CM

## 2019-05-09 LAB
EXPIRATORY TIME-POST: NORMAL SEC
EXPIRATORY TIME: NORMAL SEC
FEF 25-75% %CHNG: NORMAL
FEF 25-75% %PRED-POST: NORMAL %
FEF 25-75% %PRED-PRE: NORMAL L/SEC
FEF 25-75% PRED: NORMAL L/SEC
FEF 25-75%-POST: NORMAL L/SEC
FEF 25-75%-PRE: NORMAL L/SEC
FEV1 %PRED-POST: 66 %
FEV1 %PRED-PRE: 61 %
FEV1 PRED: 2.54 L
FEV1-POST: 1.69 L
FEV1-PRE: 1.54 L
FEV1/FVC %PRED-POST: 101 %
FEV1/FVC %PRED-PRE: 102 %
FEV1/FVC PRED: 72.5 %
FEV1/FVC-POST: 73.4 %
FEV1/FVC-PRE: 74.2 %
FVC %PRED-POST: 65 L
FVC %PRED-PRE: 59 %
FVC PRED: 3.54 L
FVC-POST: 2.3 L
FVC-PRE: 2.07 L
PEF %PRED-POST: NORMAL %
PEF %PRED-PRE: NORMAL L/SEC
PEF PRED: NORMAL L/SEC
PEF%CHNG: NORMAL
PEF-POST: NORMAL L/SEC
PEF-PRE: NORMAL L/SEC

## 2019-05-09 PROCEDURE — 94060 EVALUATION OF WHEEZING: CPT | Performed by: INTERNAL MEDICINE

## 2019-05-09 PROCEDURE — 99211 OFF/OP EST MAY X REQ PHY/QHP: CPT | Performed by: INTERNAL MEDICINE

## 2019-05-09 ASSESSMENT — PULMONARY FUNCTION TESTS
FEV1_PERCENT_PREDICTED_PRE: 61
FVC_POST: 2.30
FEV1/FVC_PERCENT_PREDICTED_POST: 101
FEV1_POST: 1.69
FVC_PREDICTED: 3.54
FEV1/FVC_PRE: 74.2
FEV1/FVC_PREDICTED: 72.5
FEV1/FVC_PERCENT_PREDICTED_PRE: 102
FEV1_PREDICTED: 2.54
FVC_PERCENT_PREDICTED_POST: 65
FEV1_PERCENT_PREDICTED_POST: 66
FVC_PERCENT_PREDICTED_PRE: 59
FEV1_PRE: 1.54
FEV1/FVC_POST: 73.4
FVC_PRE: 2.07

## 2019-05-09 NOTE — PROGRESS NOTES
Vera Flower came to office for a PFT. The patients chief complaint is mild COPD with diaphragm paralysis. He demonstrates an FEV1 of 1.54 liters. He demonstrates a mild obstructive lung defect. He shows a significant response to bronchodilators. Overall, his lung function has mildly improved over the past year. I will make no changes to his current bronchodilator therapy. These results were discussed with him directly.  All questions answered

## 2019-06-27 ENCOUNTER — PROCEDURE VISIT (OUTPATIENT)
Dept: CARDIOLOGY CLINIC | Age: 74
End: 2019-06-27
Payer: MEDICARE

## 2019-06-27 DIAGNOSIS — R06.02 SOB (SHORTNESS OF BREATH) ON EXERTION: ICD-10-CM

## 2019-06-27 DIAGNOSIS — Z95.1 S/P CABG (CORONARY ARTERY BYPASS GRAFT): ICD-10-CM

## 2019-06-27 LAB
LV EF: 52 %
LVEF MODALITY: NORMAL

## 2019-06-27 PROCEDURE — 78452 HT MUSCLE IMAGE SPECT MULT: CPT | Performed by: INTERNAL MEDICINE

## 2019-06-27 PROCEDURE — A9500 TC99M SESTAMIBI: HCPCS | Performed by: INTERNAL MEDICINE

## 2019-06-27 PROCEDURE — 93016 CV STRESS TEST SUPVJ ONLY: CPT | Performed by: INTERNAL MEDICINE

## 2019-06-27 PROCEDURE — 93017 CV STRESS TEST TRACING ONLY: CPT | Performed by: INTERNAL MEDICINE

## 2019-06-27 PROCEDURE — 93018 CV STRESS TEST I&R ONLY: CPT | Performed by: INTERNAL MEDICINE

## 2019-06-28 ENCOUNTER — TELEPHONE (OUTPATIENT)
Dept: CARDIOLOGY CLINIC | Age: 74
End: 2019-06-28

## 2019-06-28 NOTE — TELEPHONE ENCOUNTER
Called results stress Cardiolite. Stress Cardiolite 6/27/19:  Left ventricular perfusion is abnormal consistent with anterior apical infarction without ischemia. .  Normal Left ventricular size, wall motion and systolic function. Ejection fraction is 52%.

## 2019-07-17 LAB
ALBUMIN SERPL-MCNC: 3.4 G/DL
ALP BLD-CCNC: 59 U/L
ALT SERPL-CCNC: 25 U/L
ANION GAP SERPL CALCULATED.3IONS-SCNC: NORMAL MMOL/L
AST SERPL-CCNC: 14 U/L
AVERAGE GLUCOSE: NORMAL
BASOPHILS ABSOLUTE: ABNORMAL
BASOPHILS RELATIVE PERCENT: ABNORMAL
BILIRUB SERPL-MCNC: 0.5 MG/DL (ref 0.1–1.4)
BUN BLDV-MCNC: 22 MG/DL
CALCIUM SERPL-MCNC: NORMAL MG/DL
CHLORIDE BLD-SCNC: 107 MMOL/L
CHOLESTEROL, TOTAL: 87 MG/DL
CHOLESTEROL/HDL RATIO: NORMAL
CO2: NORMAL
CREAT SERPL-MCNC: 1.1 MG/DL
EOSINOPHILS ABSOLUTE: ABNORMAL
EOSINOPHILS RELATIVE PERCENT: ABNORMAL
GFR CALCULATED: NORMAL
GLUCOSE BLD-MCNC: 140 MG/DL
HBA1C MFR BLD: 7.7 %
HCT VFR BLD CALC: 32.7 % (ref 41–53)
HDLC SERPL-MCNC: 43 MG/DL (ref 35–70)
HEMOGLOBIN: 11.5 G/DL (ref 13.5–17.5)
LDL CHOLESTEROL CALCULATED: 33 MG/DL (ref 0–160)
LYMPHOCYTES ABSOLUTE: ABNORMAL
LYMPHOCYTES RELATIVE PERCENT: ABNORMAL
MCH RBC QN AUTO: ABNORMAL PG
MCHC RBC AUTO-ENTMCNC: ABNORMAL G/DL
MCV RBC AUTO: ABNORMAL FL
MONOCYTES ABSOLUTE: ABNORMAL
MONOCYTES RELATIVE PERCENT: ABNORMAL
NEUTROPHILS ABSOLUTE: ABNORMAL
NEUTROPHILS RELATIVE PERCENT: ABNORMAL
PLATELET # BLD: 202 K/ΜL
PMV BLD AUTO: ABNORMAL FL
POTASSIUM SERPL-SCNC: 5.2 MMOL/L
RBC # BLD: 3.29 10^6/ΜL
SODIUM BLD-SCNC: 139 MMOL/L
TOTAL PROTEIN: NORMAL
TRIGL SERPL-MCNC: 55 MG/DL
VLDLC SERPL CALC-MCNC: NORMAL MG/DL
WBC # BLD: 7.3 10^3/ML

## 2019-08-16 ENCOUNTER — TELEPHONE (OUTPATIENT)
Dept: CARDIOLOGY CLINIC | Age: 74
End: 2019-08-16

## 2019-09-09 ENCOUNTER — OFFICE VISIT (OUTPATIENT)
Dept: PULMONOLOGY | Age: 74
End: 2019-09-09
Payer: MEDICARE

## 2019-09-09 VITALS
HEIGHT: 67 IN | WEIGHT: 218 LBS | BODY MASS INDEX: 34.21 KG/M2 | SYSTOLIC BLOOD PRESSURE: 120 MMHG | HEART RATE: 57 BPM | RESPIRATION RATE: 18 BRPM | DIASTOLIC BLOOD PRESSURE: 58 MMHG | OXYGEN SATURATION: 98 %

## 2019-09-09 DIAGNOSIS — J44.9 COPD, MILD (HCC): ICD-10-CM

## 2019-09-09 DIAGNOSIS — J98.6 DIAPHRAGM PARALYSIS: ICD-10-CM

## 2019-09-09 DIAGNOSIS — G47.33 OBSTRUCTIVE SLEEP APNEA SYNDROME: Primary | ICD-10-CM

## 2019-09-09 PROCEDURE — 99213 OFFICE O/P EST LOW 20 MIN: CPT | Performed by: INTERNAL MEDICINE

## 2019-10-07 ENCOUNTER — OFFICE VISIT (OUTPATIENT)
Dept: CARDIOLOGY CLINIC | Age: 74
End: 2019-10-07
Payer: MEDICARE

## 2019-10-07 VITALS
DIASTOLIC BLOOD PRESSURE: 60 MMHG | RESPIRATION RATE: 16 BRPM | BODY MASS INDEX: 33.43 KG/M2 | HEART RATE: 60 BPM | SYSTOLIC BLOOD PRESSURE: 126 MMHG | HEIGHT: 67 IN | WEIGHT: 213 LBS

## 2019-10-07 DIAGNOSIS — E78.2 MIXED HYPERLIPIDEMIA: ICD-10-CM

## 2019-10-07 DIAGNOSIS — Z95.1 S/P CABG (CORONARY ARTERY BYPASS GRAFT): ICD-10-CM

## 2019-10-07 DIAGNOSIS — I10 ESSENTIAL HYPERTENSION: ICD-10-CM

## 2019-10-07 DIAGNOSIS — E08.00 DIABETES MELLITUS DUE TO UNDERLYING CONDITION WITH HYPEROSMOLARITY WITHOUT COMA, WITHOUT LONG-TERM CURRENT USE OF INSULIN (HCC): Primary | ICD-10-CM

## 2019-10-07 PROCEDURE — 99214 OFFICE O/P EST MOD 30 MIN: CPT | Performed by: INTERNAL MEDICINE

## 2020-01-08 LAB
BASOPHILS ABSOLUTE: 0.1 /ΜL
BASOPHILS RELATIVE PERCENT: 0.9 %
EOSINOPHILS ABSOLUTE: 0.3 /ΜL
EOSINOPHILS RELATIVE PERCENT: 4.4 %
HCT VFR BLD CALC: 34.9 % (ref 41–53)
HEMOGLOBIN: 11.8 G/DL (ref 13.5–17.5)
LYMPHOCYTES ABSOLUTE: 1.6 /ΜL
LYMPHOCYTES RELATIVE PERCENT: 27.4 %
MCH RBC QN AUTO: 33.5 PG
MCHC RBC AUTO-ENTMCNC: 33.8 G/DL
MCV RBC AUTO: 99.3 FL
MONOCYTES ABSOLUTE: 0.6 /ΜL
MONOCYTES RELATIVE PERCENT: 9.4 %
NEUTROPHILS ABSOLUTE: 3.4 /ΜL
NEUTROPHILS RELATIVE PERCENT: 57.9 %
PLATELET # BLD: 184 K/ΜL
PMV BLD AUTO: 8.6 FL
RBC # BLD: 3.51 10^6/ΜL
WBC # BLD: 5.9 10^3/ML

## 2020-01-10 LAB
ALBUMIN SERPL-MCNC: 3.5 G/DL
ALP BLD-CCNC: 49 U/L
ALT SERPL-CCNC: 26 U/L
ANION GAP SERPL CALCULATED.3IONS-SCNC: 6 MMOL/L
AST SERPL-CCNC: 19 U/L
BILIRUB SERPL-MCNC: 0.5 MG/DL (ref 0.1–1.4)
BUN BLDV-MCNC: 0 MG/DL
CALCIUM SERPL-MCNC: 9.1 MG/DL
CHLORIDE BLD-SCNC: 106 MMOL/L
CO2: 28 MMOL/L
CREAT SERPL-MCNC: 1 MG/DL
GFR CALCULATED: >60
GLUCOSE BLD-MCNC: 101 MG/DL
POTASSIUM SERPL-SCNC: 5.2 MMOL/L
SODIUM BLD-SCNC: 140 MMOL/L
T4 FREE: 1.12
TOTAL PROTEIN: 6.9
TSH SERPL DL<=0.05 MIU/L-ACNC: 2.08 UIU/ML

## 2020-03-10 ENCOUNTER — OFFICE VISIT (OUTPATIENT)
Dept: PULMONOLOGY | Age: 75
End: 2020-03-10
Payer: MEDICARE

## 2020-03-10 VITALS
SYSTOLIC BLOOD PRESSURE: 126 MMHG | WEIGHT: 213 LBS | HEART RATE: 64 BPM | BODY MASS INDEX: 33.43 KG/M2 | HEIGHT: 67 IN | OXYGEN SATURATION: 97 % | DIASTOLIC BLOOD PRESSURE: 60 MMHG

## 2020-03-10 PROCEDURE — 99213 OFFICE O/P EST LOW 20 MIN: CPT | Performed by: INTERNAL MEDICINE

## 2020-03-10 NOTE — PROGRESS NOTES
SUBJECTIVE:  Chief Complaint: Mild COPD, obstructive sleep apnea on CPAP, diaphragm paralysis  Tavia Marte is done very well over the past 6 months. He denies any recent bronchitic infections. He has an albuterol rescue inhaler but does not have to use it very often. He is not experiencing worsening shortness of breath. He continues to wear CPAP with a nasal mask around 6 to 8 hours per night and continues to get a good clinical benefit with less daytime sleepiness and improve daytime energy    OBJECTIVE:  /60   Pulse 64   Ht 5' 7\" (1.702 m)   Wt 213 lb (96.6 kg)   SpO2 97%   BMI 33.36 kg/m²      Physical Exam:  Constitutional:  He appears well developed and well-nourished. Neck:  Supple, No palpable lymphadenopathy, No JVD  Cardiovascular:  S1, S2 Normal, Regular rhythm, no murmurs or gallops, No pericardial  rubs. Pulmonary: Breath sounds are clear throughout all areas without wheezing or rhonchi  Abdomen: Not examined  Extremities: no edema, No DVT  Neurologic:  Awake and Alert, No focal deficits    Radiology: Chest x-ray on 3/7/2019 showed no active cardiopulmonary disease  PFT: Office spirometry on 5/9/2019 demonstrated a minimal obstructive defect with a significant response to bronchodilators        ASSESSMENT:    1. COPD, mild ([address]    2. Obstructive sleep apnea syndrome    3. Diaphragm paralysis          PLAN:   I will make no change in his current bronchodilator therapy. I would like to repeat his pulmonary function tests in several months. I did encourage him to continue wearing CPAP nightly. I will continue to follow him  Return in about 2 months (around 5/10/2020) for PFT testing, Recheck for COPD, Recheck for Obstructive Sleep Apnea. This dictation was performed with a verbal recognition program and it was checked for errors. It is possible that there are still dictated errors within this office note. Any errors should be brought immediately to my attention for correction.   All

## 2020-05-18 RX ORDER — CARVEDILOL 12.5 MG/1
12.5 TABLET ORAL 2 TIMES DAILY WITH MEALS
Qty: 180 TABLET | Refills: 3 | Status: SHIPPED | OUTPATIENT
Start: 2020-05-18 | End: 2020-12-08 | Stop reason: SDUPTHER

## 2020-06-02 ENCOUNTER — TELEMEDICINE (OUTPATIENT)
Dept: CARDIOLOGY CLINIC | Age: 75
End: 2020-06-02
Payer: MEDICARE

## 2020-06-02 VITALS
DIASTOLIC BLOOD PRESSURE: 60 MMHG | BODY MASS INDEX: 33.43 KG/M2 | HEIGHT: 67 IN | WEIGHT: 213 LBS | HEART RATE: 60 BPM | SYSTOLIC BLOOD PRESSURE: 118 MMHG

## 2020-06-02 PROCEDURE — 99214 OFFICE O/P EST MOD 30 MIN: CPT | Performed by: INTERNAL MEDICINE

## 2020-06-02 NOTE — PATIENT INSTRUCTIONS
Continue current cardiovascular medications which have been reviewed and discussed individually with you. Counseled for calorie counting, reduction in serving size and exercise and lifestyle modification for weight loss. Multiple questions are answered and patient verbalized understanding. Follow up office visit in 6 months with ECG.  Please bring all medication bottles and most recent labs to each office visit

## 2020-06-30 ENCOUNTER — NURSE ONLY (OUTPATIENT)
Dept: PULMONOLOGY | Age: 75
End: 2020-06-30
Payer: MEDICARE

## 2020-06-30 VITALS — BODY MASS INDEX: 33.36 KG/M2 | TEMPERATURE: 97.3 F | HEIGHT: 67 IN

## 2020-06-30 LAB
EXPIRATORY TIME-POST: NORMAL
EXPIRATORY TIME: NORMAL
FEF 25-75% %CHNG: NORMAL
FEF 25-75% %PRED-POST: NORMAL
FEF 25-75% %PRED-PRE: NORMAL
FEF 25-75% PRED: NORMAL
FEF 25-75%-POST: NORMAL
FEF 25-75%-PRE: NORMAL
FEV1 %PRED-POST: 47.2 %
FEV1 %PRED-PRE: 51.2 %
FEV1 PRED: 2.65 L
FEV1-POST: 1.25 L
FEV1-PRE: 1.36 L
FEV1/FVC %PRED-POST: 98.9 %
FEV1/FVC %PRED-PRE: 101.9 %
FEV1/FVC PRED: 72.3 %
FEV1/FVC-POST: 71.5 %
FEV1/FVC-PRE: 73.7 %
FVC %PRED-POST: 47.3 L
FVC %PRED-PRE: 49.8 %
FVC PRED: 3.7 L
FVC-POST: 1.75 L
FVC-PRE: 1.84 L
PEF %PRED-POST: NORMAL
PEF %PRED-PRE: NORMAL
PEF PRED: NORMAL
PEF%CHNG: NORMAL
PEF-POST: NORMAL
PEF-PRE: NORMAL

## 2020-06-30 PROCEDURE — 99999 PR OFFICE/OUTPT VISIT,PROCEDURE ONLY: CPT | Performed by: INTERNAL MEDICINE

## 2020-06-30 PROCEDURE — 94060 EVALUATION OF WHEEZING: CPT | Performed by: INTERNAL MEDICINE

## 2020-06-30 ASSESSMENT — PULMONARY FUNCTION TESTS
FEV1/FVC_PRE: 73.7
FEV1_PERCENT_PREDICTED_PRE: 51.2
FEV1_POST: 1.25
FEV1/FVC_PERCENT_PREDICTED_PRE: 101.9
FEV1_PREDICTED: 2.65
FEV1/FVC_POST: 71.5
FVC_PERCENT_PREDICTED_POST: 47.3
FEV1/FVC_PERCENT_PREDICTED_POST: 98.9
FEV1_PERCENT_PREDICTED_POST: 47.2
FVC_POST: 1.75
FVC_PRE: 1.84
FVC_PERCENT_PREDICTED_PRE: 49.8
FEV1_PRE: 1.36
FEV1/FVC_PREDICTED: 72.3
FVC_PREDICTED: 3.70

## 2020-09-03 LAB
ALBUMIN SERPL-MCNC: 3.5 G/DL
ALP BLD-CCNC: 59 U/L
ALT SERPL-CCNC: 25 U/L
ANION GAP SERPL CALCULATED.3IONS-SCNC: NORMAL MMOL/L
AST SERPL-CCNC: 19 U/L
BASOPHILS ABSOLUTE: ABNORMAL
BASOPHILS RELATIVE PERCENT: ABNORMAL
BILIRUB SERPL-MCNC: 0.5 MG/DL (ref 0.1–1.4)
BUN BLDV-MCNC: 12 MG/DL
CALCIUM SERPL-MCNC: NORMAL MG/DL
CHLORIDE BLD-SCNC: 104 MMOL/L
CHOLESTEROL, TOTAL: 97 MG/DL
CHOLESTEROL/HDL RATIO: NORMAL
CO2: NORMAL
CREAT SERPL-MCNC: 1.1 MG/DL
EOSINOPHILS ABSOLUTE: ABNORMAL
EOSINOPHILS RELATIVE PERCENT: ABNORMAL
GFR CALCULATED: NORMAL
GLUCOSE BLD-MCNC: 111 MG/DL
HCT VFR BLD CALC: 34 % (ref 41–53)
HDLC SERPL-MCNC: 41 MG/DL (ref 35–70)
HEMOGLOBIN: 11.9 G/DL (ref 13.5–17.5)
LDL CHOLESTEROL CALCULATED: 39 MG/DL (ref 0–160)
LYMPHOCYTES ABSOLUTE: ABNORMAL
LYMPHOCYTES RELATIVE PERCENT: ABNORMAL
MCH RBC QN AUTO: ABNORMAL PG
MCHC RBC AUTO-ENTMCNC: ABNORMAL G/DL
MCV RBC AUTO: ABNORMAL FL
MONOCYTES ABSOLUTE: ABNORMAL
MONOCYTES RELATIVE PERCENT: ABNORMAL
NEUTROPHILS ABSOLUTE: ABNORMAL
NEUTROPHILS RELATIVE PERCENT: ABNORMAL
NONHDLC SERPL-MCNC: NORMAL MG/DL
PLATELET # BLD: 170 K/ΜL
PMV BLD AUTO: ABNORMAL FL
POTASSIUM SERPL-SCNC: 4.7 MMOL/L
RBC # BLD: 3.46 10^6/ΜL
SODIUM BLD-SCNC: 138 MMOL/L
TOTAL PROTEIN: NORMAL
TRIGL SERPL-MCNC: 83 MG/DL
VLDLC SERPL CALC-MCNC: NORMAL MG/DL
WBC # BLD: 7.4 10^3/ML

## 2020-12-08 ENCOUNTER — OFFICE VISIT (OUTPATIENT)
Dept: CARDIOLOGY CLINIC | Age: 75
End: 2020-12-08
Payer: MEDICARE

## 2020-12-08 VITALS
WEIGHT: 216 LBS | DIASTOLIC BLOOD PRESSURE: 64 MMHG | RESPIRATION RATE: 16 BRPM | HEART RATE: 58 BPM | TEMPERATURE: 97.2 F | HEIGHT: 67 IN | BODY MASS INDEX: 33.9 KG/M2 | SYSTOLIC BLOOD PRESSURE: 118 MMHG

## 2020-12-08 PROCEDURE — 99214 OFFICE O/P EST MOD 30 MIN: CPT | Performed by: INTERNAL MEDICINE

## 2020-12-08 PROCEDURE — 93000 ELECTROCARDIOGRAM COMPLETE: CPT | Performed by: INTERNAL MEDICINE

## 2020-12-08 RX ORDER — CARVEDILOL 12.5 MG/1
12.5 TABLET ORAL 2 TIMES DAILY WITH MEALS
Qty: 180 TABLET | Refills: 3 | Status: SHIPPED | OUTPATIENT
Start: 2020-12-08 | End: 2021-06-11

## 2020-12-08 RX ORDER — SENNOSIDES 8.6 MG
650 CAPSULE ORAL PRN
COMMUNITY

## 2020-12-08 NOTE — PROGRESS NOTES
history includes Cancer in his mother; Diabetes in his paternal aunt and sister. ALLERGIES:  Lisinopril  Prior to Admission medications    Medication Sig Start Date End Date Taking? Authorizing Provider   acetaminophen (8 HR ARTHRITIS PAIN RELIEF) 650 MG extended release tablet Take 650 mg by mouth as needed for Pain   Yes Historical Provider, MD   carvedilol (COREG) 12.5 MG tablet Take 1 tablet by mouth 2 times daily (with meals) 12/8/20  Yes Lauren Terry MD   BiPAP Machine MISC by Does not apply route nightly   Yes Historical Provider, MD   albuterol sulfate (PROAIR RESPICLICK) 231 (90 Base) MCG/ACT aerosol powder inhalation Inhale 2 puffs into the lungs every 4 hours as needed for Wheezing or Shortness of Breath 3/7/19  Yes Lynette Pearson MD   Cyanocobalamin (VITAMIN B12 PO) Take 2,500 mcg by mouth   Yes Historical Provider, MD   Multiple Vitamins-Minerals (THERAPEUTIC MULTIVITAMIN-MINERALS) tablet Take 1 tablet by mouth daily   Yes Historical Provider, MD   Lutein 20 MG CAPS Take by mouth   Yes Historical Provider, MD   ascorbic acid (VITAMIN C) 500 MG tablet Take 500 mg by mouth daily   Yes Historical Provider, MD   Coenzyme Q10 (CO Q 10 PO) Take 200 mg by mouth daily. Yes Historical Provider, MD   levothyroxine (SYNTHROID) 88 MCG tablet Take 88 mcg by mouth Daily. Yes Historical Provider, MD   simvastatin (ZOCOR) 40 MG tablet Take 1/2 tablet daily at bedtime. Yes Historical Provider, MD   metformin (GLUCOPHAGE) 1000 MG tablet Take 1,000 mg by mouth 2 times daily (with meals). Yes Historical Provider, MD   insulin aspart (NOVOLOG) 100 UNIT/ML injection Inject 4 Units into the skin 3 times daily (before meals)    Yes Historical Provider, MD   insulin glargine (LANTUS) 100 UNIT/ML injection Inject  into the skin daily. Indications: 36 units daily   Yes Historical Provider, MD   aspirin 81 MG chewable tablet Take 81 mg by mouth daily.      Yes Historical Provider, MD   losartan (COZAAR) 25 MG tablet Take 25 mg by mouth daily. Yes Historical Provider, MD     Vitals:    12/08/20 1025   BP: 118/64   Site: Left Upper Arm   Position: Sitting   Cuff Size: Medium Adult   Pulse: 58   Resp: 16   Temp: 97.2 °F (36.2 °C)   Weight: 216 lb (98 kg)   Height: 5' 7\" (1.702 m)      Body mass index is 33.83 kg/m². Wt Readings from Last 3 Encounters:   12/08/20 216 lb (98 kg)   06/02/20 213 lb (96.6 kg)   03/10/20 213 lb (96.6 kg)     Constitutional: Moderately obese pleasant male in no apparent distress. gained 3 pounds. Eyes: He wears glasses pupils are equal.  ENT: wearing face mask otherwise Unremarkable  NECK: No JVP or thyromegaly  Cardiovascular:  Auscultation: Normal S1 and S2.  No murmurs or gallops noted. Carotids are negative for bruits.  Abdominal aorta is normal.  No epigastric bruit noted. Peripheral pulses: 1+ equal  Respiratory:  Respiratory effort is normal. Breath sounds are diminished in the right lower chest.  Extremities: Has bilateral 1-2+ pitting edema of leg worse on the left than on the right.  No clubbing,  Cyanosis, petechiae. SKIN: Warm and well perfused, no pallor or cyanosis  Abdomen:  No masses or tenderness. No organomegaly noted. Musculoskeletal:  No spinal deformities noted.  Gait is normal  Muscle strength is normal  Neurologic:  Oriented to time, place, and person and non-anxious. No focal neurological deficit noted.   Psychiatric: Normal mood and effect.     EKG is c/w sinus rhythm 58 bpm. Old AWMI non specific ST T changes    LAB REVIEW:  CBC:   Lab Results   Component Value Date    WBC 7.4 09/03/2020    HGB 11.9 09/03/2020    HCT 34.0 09/03/2020     09/03/2020     Lipids:   Lab Results   Component Value Date    CHOL 97 09/03/2020    TRIG 83 09/03/2020    HDL 41 09/03/2020    LDLCALC 39 09/03/2020     Renal:   Lab Results   Component Value Date    BUN 12 09/03/2020    CREATININE 1.1 09/03/2020     09/03/2020    K 4.7 09/03/2020     PT/INR:   Lab Results   Component

## 2020-12-08 NOTE — PATIENT INSTRUCTIONS
Continue current cardiovascular medications which have been reviewed and discussed individually with you. Continue to exercise and diet and lose weight. Secondary prevention is the goal by aggressive risk modification, healthy and therapeutic life style changes for cardiovascular risk reduction. Various goals are discussed and questions answered. Appropriate prescriptions if needed on this visit are addressed. After visit summery is provided. Questions answered and patient verbalizes understanding. Follow up in 6 months,  sooner if needed.

## 2020-12-09 ENCOUNTER — OFFICE VISIT (OUTPATIENT)
Dept: PULMONOLOGY | Age: 75
End: 2020-12-09
Payer: MEDICARE

## 2020-12-09 VITALS
DIASTOLIC BLOOD PRESSURE: 68 MMHG | BODY MASS INDEX: 33.9 KG/M2 | HEIGHT: 67 IN | WEIGHT: 216 LBS | OXYGEN SATURATION: 99 % | SYSTOLIC BLOOD PRESSURE: 118 MMHG | HEART RATE: 64 BPM

## 2020-12-09 PROCEDURE — 99213 OFFICE O/P EST LOW 20 MIN: CPT | Performed by: INTERNAL MEDICINE

## 2020-12-09 NOTE — PROGRESS NOTES
recommend he get the COVID-19 vaccine when it becomes available. I will continue to follow him    We have discussed the need to maintain yearly flu immunization, pneumococcal vaccination. We have discussed Coronavirus precaution including handwashing practice, wiping items touched in public such as gas pumps, door handles, shopping carts, etc. Self monitoring for infection - fever, chills, cough, SOB. Should they develop symptoms they should call office for further instructions. Return in about 6 months (around 6/9/2021) for Recheck for COPD, Recheck for Shortness of Breath, Recheck for Obstructive Sleep Apnea. This dictation was performed with a verbal recognition program and it was checked for errors. It is possible that there are still dictated errors within this office note. Any errors should be brought immediately to my attention for correction. All efforts were made to ensure that this office note is accurate.

## 2021-05-10 LAB
ALBUMIN SERPL-MCNC: 3.6 G/DL
ALP BLD-CCNC: 63 U/L
ALT SERPL-CCNC: 24 U/L
ANION GAP SERPL CALCULATED.3IONS-SCNC: 1.1 MMOL/L
AST SERPL-CCNC: 21 U/L
AVERAGE GLUCOSE: NORMAL
BASOPHILS ABSOLUTE: 0 /ΜL
BASOPHILS RELATIVE PERCENT: 0.6 %
BILIRUB SERPL-MCNC: 0.5 MG/DL (ref 0.1–1.4)
BUN BLDV-MCNC: 15 MG/DL
CALCIUM SERPL-MCNC: 9.7 MG/DL
CHLORIDE BLD-SCNC: 104 MMOL/L
CO2: 28 MMOL/L
CREAT SERPL-MCNC: 1.1 MG/DL
EOSINOPHILS ABSOLUTE: 0.4 /ΜL
EOSINOPHILS RELATIVE PERCENT: 6 %
GFR CALCULATED: 60
GLUCOSE BLD-MCNC: 115 MG/DL
HBA1C MFR BLD: 8.2 %
HCT VFR BLD CALC: 35 % (ref 41–53)
HEMOGLOBIN: 12.1 G/DL (ref 13.5–17.5)
LYMPHOCYTES ABSOLUTE: 1.7 /ΜL
LYMPHOCYTES RELATIVE PERCENT: 27.7 %
MCH RBC QN AUTO: 34.2 PG
MCHC RBC AUTO-ENTMCNC: 34.6 G/DL
MCV RBC AUTO: 98.9 FL
MONOCYTES ABSOLUTE: 0.5 /ΜL
MONOCYTES RELATIVE PERCENT: 8.1 %
NEUTROPHILS ABSOLUTE: 3.6 /ΜL
NEUTROPHILS RELATIVE PERCENT: 57.6 %
PLATELET # BLD: 173 K/ΜL
PMV BLD AUTO: 8.7 FL
POTASSIUM SERPL-SCNC: 4.7 MMOL/L
RBC # BLD: 3.54 10^6/ΜL
SODIUM BLD-SCNC: 137 MMOL/L
TOTAL PROTEIN: 6.8
WBC # BLD: 6.2 10^3/ML

## 2021-06-08 ENCOUNTER — OFFICE VISIT (OUTPATIENT)
Dept: CARDIOLOGY CLINIC | Age: 76
End: 2021-06-08
Payer: MEDICARE

## 2021-06-08 VITALS
SYSTOLIC BLOOD PRESSURE: 130 MMHG | RESPIRATION RATE: 16 BRPM | HEIGHT: 67 IN | HEART RATE: 68 BPM | WEIGHT: 212 LBS | BODY MASS INDEX: 33.27 KG/M2 | DIASTOLIC BLOOD PRESSURE: 64 MMHG

## 2021-06-08 DIAGNOSIS — M79.89 LEG SWELLING: ICD-10-CM

## 2021-06-08 DIAGNOSIS — E66.9 OBESITY (BMI 30.0-34.9): ICD-10-CM

## 2021-06-08 DIAGNOSIS — E78.00 PURE HYPERCHOLESTEROLEMIA: ICD-10-CM

## 2021-06-08 DIAGNOSIS — G47.33 OBSTRUCTIVE SLEEP APNEA SYNDROME: ICD-10-CM

## 2021-06-08 DIAGNOSIS — I10 ESSENTIAL HYPERTENSION: ICD-10-CM

## 2021-06-08 DIAGNOSIS — E08.00 DIABETES MELLITUS DUE TO UNDERLYING CONDITION WITH HYPEROSMOLARITY WITHOUT COMA, WITHOUT LONG-TERM CURRENT USE OF INSULIN (HCC): ICD-10-CM

## 2021-06-08 DIAGNOSIS — Z95.1 S/P CABG (CORONARY ARTERY BYPASS GRAFT): Primary | ICD-10-CM

## 2021-06-08 PROCEDURE — 99214 OFFICE O/P EST MOD 30 MIN: CPT | Performed by: INTERNAL MEDICINE

## 2021-06-08 RX ORDER — SIMVASTATIN 20 MG
TABLET ORAL
COMMUNITY
Start: 2021-05-16

## 2021-06-08 NOTE — PATIENT INSTRUCTIONS
Counseled to elevate feet when resting and when resting in bed at night with pillows underneath and use thigh high compression stockings in the morning and remove them at night. Patient verbalizes understanding. Questions answered. Venous doppler of lower extremity to assess for venous insufficiency. Follow-up in 6 weeks with EKG, sooner if needed.

## 2021-06-08 NOTE — PROGRESS NOTES
Mario Perla (:  1945) is a 67 y.o. male,     Chief Complaint   Patient presents with    Coronary Artery Disease     Pt has bilateral edema in legs. Pt denies any other cardiac concerns. Pt is non-smoker.  Hypertension    Hyperlipidemia     Patient is here for follow up for leg swelling. He has chronic hypertension hyperlipidemia and known coronary artery disease status post bypass surgery and history of obstructive sleep apnea on CPAP therapy. Any chest pains or shortness of breath. Dyspnea on exertion has been stable has chronic elevated diaphragm. He is compliant with his medications. Recent labs are reviewed and discussed with him. He is fully vaccinated for COVID-19. Former smoker. Allergies   Allergen Reactions    Lisinopril Other (See Comments)     cough     Prior to Admission medications    Medication Sig Start Date End Date Taking? Authorizing Provider   simvastatin (ZOCOR) 20 MG tablet TAKE 1 TABLET BY MOUTH AT BEDTIME 21  Yes Historical Provider, MD   Compression Stockings MISC by Does not apply route 20-30 mmg thigh high stockings wear daily when up and remove at night at bedtime.  21  Yes Willi Stoddard MD   acetaminophen (8 HR ARTHRITIS PAIN RELIEF) 650 MG extended release tablet Take 650 mg by mouth as needed for Pain   Yes Historical Provider, MD   carvedilol (COREG) 12.5 MG tablet Take 1 tablet by mouth 2 times daily (with meals) 20  Yes Willi Stoddard MD   BiPAP Machine MISC by Does not apply route nightly   Yes Historical Provider, MD   albuterol sulfate (PROAIR RESPICLICK) 227 (90 Base) MCG/ACT aerosol powder inhalation Inhale 2 puffs into the lungs every 4 hours as needed for Wheezing or Shortness of Breath 3/7/19  Yes Amy Schultz MD   Cyanocobalamin (VITAMIN B12 PO) Take 2,500 mcg by mouth   Yes Historical Provider, MD   Multiple Vitamins-Minerals (THERAPEUTIC MULTIVITAMIN-MINERALS) tablet Take 1 tablet by mouth daily   Yes Historical Provider, MD Lutein 20 MG CAPS Take by mouth   Yes Historical Provider, MD   ascorbic acid (VITAMIN C) 500 MG tablet Take 500 mg by mouth daily   Yes Historical Provider, MD   Coenzyme Q10 (CO Q 10 PO) Take 200 mg by mouth daily. Yes Historical Provider, MD   levothyroxine (SYNTHROID) 88 MCG tablet Take 88 mcg by mouth Daily. Yes Historical Provider, MD   metformin (GLUCOPHAGE) 1000 MG tablet Take 1,000 mg by mouth 2 times daily (with meals). Yes Historical Provider, MD   insulin aspart (NOVOLOG) 100 UNIT/ML injection Inject 4 Units into the skin 3 times daily (before meals)    Yes Historical Provider, MD   insulin glargine (LANTUS) 100 UNIT/ML injection Inject  into the skin daily. Indications: 36 units daily   Yes Historical Provider, MD   aspirin 81 MG chewable tablet Take 81 mg by mouth daily. Yes Historical Provider, MD   losartan (COZAAR) 25 MG tablet Take 25 mg by mouth daily. Yes Historical Provider, MD     Past Medical History:   Diagnosis Date    Anemia     Asthma     CAD (coronary artery disease) 6/04    Cardiomyopathy ([address]     Cataract     COPD, mild ([address] 3/30/2016    Diabetes mellitus ([address] 1990    Onset @ age 51. 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 LV perfusion is abnormal consistent w/anterior apical infarction without ischemia, normal LV size, wall motion and systolic function, EF 37%.  H/O cardiovascular stress test 3/11/2003  muga    mild lv dysfxn at rest with appropriate but less than optimal response to exercise    H/O chest x-ray 7/27/2004    increased density in the lll is again noticed either due to atelectasis or fibrosis    H/O echocardiogram 4/18/12    EF=50-55% & Trace tricuspid regurg.     History of cardiac cath 6/17/2004    LM normal, lad occluded prox, soco has non critical disease, cx prox 90 abd 70 , rca prox 90 ef 45     Hyperlipidemia     Hypertension     Leg swelling 10/2/2017    Median nerve compression     right arm, hand    [facility]     Mononeuritis multiplex     Retinal hemorrhage 12/2015    had laser procedures 2/2016    S/P CABG x 4 6/04    Sleep apnea 3/30/2017    SOB (shortness of breath) on exertion 4/29/2019      Vitals:    06/08/21 1055   BP: 130/64   Pulse: 68   Resp: 16   Weight: 212 lb (96.2 kg)   Height: 5' 7\" (1.702 m)      Body mass index is 33.2 kg/m². Wt Readings from Last 3 Encounters:   06/08/21 212 lb (96.2 kg)   12/09/20 216 lb (98 kg)   12/08/20 216 lb (98 kg)     Constitutional: Moderately obese pleasant male in no apparent distress. Lost 4 pounds. Eyes: He wears glasses pupils are equal.  ENT: wearing face mask otherwise Unremarkable  NECK: No JVP or thyromegaly  Cardiovascular:  Auscultation: Normal S1 and S2.  No murmurs or gallops noted. Carotids are negative for bruits.  Abdominal aorta is normal.  No epigastric bruit noted. Peripheral pulses: 1+ equal  Respiratory:  Respiratory effort is normal. Breath sounds are diminished in the right lower chest.  Extremities: Has bilateral 1-2+ pitting edema of leg worse on the left than on the right.     SKIN: Warm and well perfused, no pallor or cyanosis  Abdomen:  No masses or tenderness. No organomegaly noted. Musculoskeletal:  No spinal deformities noted.  Gait is normal  Muscle strength is normal  Neurologic:  Oriented to time, place, and person and non-anxious. No focal neurological deficit noted.   Psychiatric: Normal mood and effect.     Pertinent records reviewed and discussed with patient and results are as follow:    Lab Results   Component Value Date    WBC 6.2 05/10/2021    HGB 12.1 05/10/2021    HCT 35.0 05/10/2021     05/10/2021     Lab Results   Component Value Date    CHOL 97 09/03/2020    TRIG 83 09/03/2020    HDL 41 09/03/2020    LDLCALC 39 09/03/2020     Lab Results   Component Value Date    BUN 15 05/10/2021    CREATININE 1.1 05/10/2021     05/10/2021    K 4.7 05/10/2021     Lab Results   Component Value Date    INR 0.93 04/18/2012     Lab Results   Component Value Date    LABA1C 8.2 05/10/2021     ASSESSMENT/PLAN:    1. S/P CABG + TMR 2004  2. Diabetes mellitus due to underlying condition with hyperosmolarity without coma, without long-term current use of insulin ([address]  3. Essential hypertension  4. Pure hypercholesterolemia  5. Leg swelling  -     VL DUP LOWER EXTREMITY VENOUS BILATERAL; Future  6. Obstructive sleep apnea syndrome  7. Obesity (BMI 30.0-34. 9)    Counseled to elevate feet when resting and when resting in bed at night with pillows underneath and use thigh high compression stockings in the morning and remove them at night. Patient verbalizes understanding. Questions answered. Venous doppler of lower extremity to assess for venous insufficiency. Follow-up in 6 weeks with EKG, sooner if needed. An electronic signature was used to authenticate this note.     --Samia Govea MD

## 2021-06-09 ENCOUNTER — OFFICE VISIT (OUTPATIENT)
Dept: PULMONOLOGY | Age: 76
End: 2021-06-09
Payer: MEDICARE

## 2021-06-09 VITALS
SYSTOLIC BLOOD PRESSURE: 120 MMHG | DIASTOLIC BLOOD PRESSURE: 68 MMHG | OXYGEN SATURATION: 96 % | WEIGHT: 212 LBS | HEIGHT: 67 IN | BODY MASS INDEX: 33.27 KG/M2 | HEART RATE: 67 BPM

## 2021-06-09 DIAGNOSIS — J44.9 COPD, MILD (HCC): ICD-10-CM

## 2021-06-09 DIAGNOSIS — R06.02 SOB (SHORTNESS OF BREATH) ON EXERTION: ICD-10-CM

## 2021-06-09 DIAGNOSIS — G47.33 OBSTRUCTIVE SLEEP APNEA SYNDROME: Primary | ICD-10-CM

## 2021-06-09 DIAGNOSIS — J98.6 DIAPHRAGM PARALYSIS: ICD-10-CM

## 2021-06-09 PROCEDURE — 99213 OFFICE O/P EST LOW 20 MIN: CPT | Performed by: INTERNAL MEDICINE

## 2021-06-09 NOTE — PROGRESS NOTES
SUBJECTIVE:  Chief Complaint: Minimal COPD, obstructive sleep apnea on CPAP, mild dyspnea exertion, diaphragm paralysis  Grady Torres states that he has had no bronchitic infections. He he still notes minimal dyspnea on exertion but is not short of breath typically at rest or doing simple activities. He continues to use an albuterol rescue inhaler as needed but does not require it very often. He has had no known COVID-19 exposure or infection and has received both Moderna over 19 vaccination. He states that he was asthmatic as a child and had significant secondary smoke exposure as an adult but never smoked himself. He also recalls undergoing coronary bypass grafting in 2004 and does follow with Dr. Annette Weinstein. He has been bothered by some mild leg swelling but has not been in congestive heart failure. He was recently given support hose to wear. 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 This occurred 13 years ago following CABG. ROS:  Constitution:  HEENT: Negative for ear, throat pain  Cardiovascular: Negative for chest pain, syncope, edema  Pulmonary: See HPI  Musculoskeletal: Negative for DVT, myalgias, arthralgias    OBJECTIVE:  /68   Pulse 67   Ht 5' 7\" (1.702 m)   Wt 212 lb (96.2 kg)   SpO2 96%   BMI 33.20 kg/m²      Physical Exam:  Constitutional:  He appears well developed and well-nourished. Neck:  Supple, No palpable lymphadenopathy, No JVD  Cardiovascular:  S1, S2 Normal, Regular rhythm, no murmurs or gallops, No pericardial  rubs.   Pulmonary: Breath sounds are clear throughout all areas without wheezing or rhonchi  Abdomen: Not examined  Extremities: no edema, No DVT  Neurologic: Oriented x3, No focal deficits    Radiology: Chest x-ray last obtained on 3/7/2019 showed no active cardiopulmonary disease  PFT: Office spirometry on 6/30/2020 demonstrated a mild obstructive defect with no significant response to bronchodilators and overall his lung function had minimally decreased over the previous year      Echocardiogram: No recent echo    ASSESSMENT:    1. Obstructive sleep apnea syndrome    2. COPD, mild ([address]    3. Diaphragm paralysis    4. SOB (shortness of breath) on exertion          PLAN:   I will make no change in his current bronchodilator therapy. I would like to repeat his pulmonary function test in near future. I will consider repeating his chest x-ray on his next visit. I did encourage him to continue wearing CPAP and get new CPAP supplies and mask every 6 months. I will continue to follow him    We have discussed the need to maintain yearly flu immunization, pneumococcal vaccination. We have discussed Coronavirus precaution including handwashing practice, wiping items touched in public such as gas pumps, door handles, shopping carts, etc. Self monitoring for infection - fever, chills, cough, SOB. Should they develop symptoms they should call office for further instructions. Return in about 2 months (around 8/9/2021) for Recheck for COPD, Recheck for Obstructive Sleep Apnea, Recheck for Shortness of Breath. This dictation was performed with a verbal recognition program and it was checked for errors. It is possible that there are still dictated errors within this office note. Any errors should be brought immediately to my attention for correction. All efforts were made to ensure that this office note is accurate.

## 2021-06-11 RX ORDER — CARVEDILOL 12.5 MG/1
TABLET ORAL
Qty: 180 TABLET | Refills: 3 | Status: SHIPPED | OUTPATIENT
Start: 2021-06-11 | End: 2021-07-20

## 2021-06-22 ENCOUNTER — PROCEDURE VISIT (OUTPATIENT)
Dept: CARDIOLOGY CLINIC | Age: 76
End: 2021-06-22
Payer: MEDICARE

## 2021-06-22 DIAGNOSIS — M79.89 LEG SWELLING: Primary | ICD-10-CM

## 2021-06-22 PROCEDURE — 93970 EXTREMITY STUDY: CPT | Performed by: INTERNAL MEDICINE

## 2021-06-23 ENCOUNTER — TELEPHONE (OUTPATIENT)
Dept: CARDIOLOGY CLINIC | Age: 76
End: 2021-06-23

## 2021-06-23 NOTE — TELEPHONE ENCOUNTER
I called patient to give results of Venous doppler. Patient answered and then hung up. I was unable to give patient his results which are abnormal. Patient needs follow up.

## 2021-07-20 ENCOUNTER — OFFICE VISIT (OUTPATIENT)
Dept: CARDIOLOGY CLINIC | Age: 76
End: 2021-07-20
Payer: MEDICARE

## 2021-07-20 VITALS
WEIGHT: 207 LBS | DIASTOLIC BLOOD PRESSURE: 60 MMHG | BODY MASS INDEX: 32.49 KG/M2 | HEART RATE: 60 BPM | HEIGHT: 67 IN | SYSTOLIC BLOOD PRESSURE: 126 MMHG

## 2021-07-20 DIAGNOSIS — Z95.1 S/P CABG (CORONARY ARTERY BYPASS GRAFT): ICD-10-CM

## 2021-07-20 DIAGNOSIS — I10 HYPERTENSION, UNSPECIFIED TYPE: Primary | ICD-10-CM

## 2021-07-20 DIAGNOSIS — M79.89 LEG SWELLING: ICD-10-CM

## 2021-07-20 DIAGNOSIS — R42 DIZZY SPELLS: ICD-10-CM

## 2021-07-20 PROCEDURE — 93000 ELECTROCARDIOGRAM COMPLETE: CPT | Performed by: INTERNAL MEDICINE

## 2021-07-20 PROCEDURE — 99214 OFFICE O/P EST MOD 30 MIN: CPT | Performed by: INTERNAL MEDICINE

## 2021-07-20 RX ORDER — CARVEDILOL 12.5 MG/1
6.25 TABLET ORAL 2 TIMES DAILY WITH MEALS
Qty: 180 TABLET | Refills: 3
Start: 2021-07-20 | End: 2022-04-04 | Stop reason: SDUPTHER

## 2021-07-20 NOTE — PROGRESS NOTES
Jose Corona (:  1945) is a 67 y.o. male,     Chief Complaint   Patient presents with    Coronary Artery Disease     OV for 6 week check. Pt denies any chest pain,SOB,swelling to ankles, or palpitations. He does have dizziness.  Dizziness    Cardiomyopathy     Patient is here for follow up for dizziness. He takes his blood pressure one time it was 79 mmHg systolic. Second time it was 113 mmHg. This lasted for several minutes. Denies syncope or near syncope. He is using compression stockings which is difficult for him to put them on however he is compliant with instructions and seems this is helping his leg swelling. He lost 5 pounds. Denies any chest pains or shortness of breath and has been compliant to all other medications. Allergies   Allergen Reactions    Lisinopril Other (See Comments)     cough     Prior to Admission medications    Medication Sig Start Date End Date Taking? Authorizing Provider   carvedilol (COREG) 12.5 MG tablet Take 0.5 tablets by mouth 2 times daily (with meals) 21  Yes Leidy Espana MD   simvastatin (ZOCOR) 20 MG tablet TAKE 1 TABLET BY MOUTH AT BEDTIME 21  Yes Historical Provider, MD   Compression Stockings MISC by Does not apply route 20-30 mmg thigh high stockings wear daily when up and remove at night at bedtime.  21  Yes Leidy Espana MD   acetaminophen (8 HR ARTHRITIS PAIN RELIEF) 650 MG extended release tablet Take 650 mg by mouth as needed for Pain   Yes Historical Provider, MD   BiPAP Machine MISC by Does not apply route nightly   Yes Historical Provider, MD   albuterol sulfate (PROAIR RESPICLICK) 490 (90 Base) MCG/ACT aerosol powder inhalation Inhale 2 puffs into the lungs every 4 hours as needed for Wheezing or Shortness of Breath 3/7/19  Yes Dee Kyle MD   Cyanocobalamin (VITAMIN B12 PO) Take 2,500 mcg by mouth   Yes Historical Provider, MD   Multiple Vitamins-Minerals (THERAPEUTIC MULTIVITAMIN-MINERALS) tablet Take 1 tablet by mouth daily   Yes Historical Provider, MD   Lutein 20 MG CAPS Take by mouth   Yes Historical Provider, MD   ascorbic acid (VITAMIN C) 500 MG tablet Take 500 mg by mouth daily   Yes Historical Provider, MD   Coenzyme Q10 (CO Q 10 PO) Take 200 mg by mouth daily. Yes Historical Provider, MD   levothyroxine (SYNTHROID) 88 MCG tablet Take 88 mcg by mouth Daily. Yes Historical Provider, MD   metformin (GLUCOPHAGE) 1000 MG tablet Take 1,000 mg by mouth 2 times daily (with meals). Yes Historical Provider, MD   insulin aspart (NOVOLOG) 100 UNIT/ML injection Inject 4 Units into the skin 3 times daily (before meals)    Yes Historical Provider, MD   insulin glargine (LANTUS) 100 UNIT/ML injection Inject  into the skin daily. Indications: 36 units daily   Yes Historical Provider, MD   aspirin 81 MG chewable tablet Take 81 mg by mouth daily. Yes Historical Provider, MD   losartan (COZAAR) 25 MG tablet Take 25 mg by mouth daily. Yes Historical Provider, MD     Past Medical History:   Diagnosis Date    Anemia     Asthma     CAD (coronary artery disease) 06/2004    Cardiomy[facility] ([facility]     Cataract     COPD, mild ([address] 03/30/2016    Diabetes mellitus ([address] 1990    Onset @ age 51. 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 LV perfusion is abnormal consistent w/anterior apical infarction without ischemia, normal LV size, wall motion and systolic function, EF 36%.  H/O cardiovascular stress test 3/11/2003  muga    mild lv dysfxn at rest with appropriate but less than optimal response to exercise    H/O chest x-ray 07/27/2004    increased density in the lll is again noticed either due to atelectasis or fibrosis    H/O Doppler ultrasound 06/22/2021    Venous-Evidence of Chronic, non occlusive SVT of the left SSV from prox to distal calf.  Significant reflux noted of the Right Popliteal vein and the GSV at the  level of mid thigh (1.3s) and distal calf (1.4s). No significant reflux noted in the veins of the left lower extremity.  H/O echocardiogram 04/18/2012    EF=50-55% & Trace tricuspid regurg.  History of cardiac cath 06/17/2004    LM normal, lad occluded prox, soco has non critical disease, cx prox 90 abd 70 , rca prox 90 ef 45     Hyperlipidemia     Hypertension     Leg swelling 10/02/2017    Median nerve compression     right arm, hand    MI (myocardial infarction) ([address]     Mononeuritis multiplex     Retinal hemorrhage 12/2015    had laser procedures 2/2016    S/P CABG x 4 06/2004    Sleep apnea 03/30/2017    SOB (shortness of breath) on exertion 04/29/2019      Vitals:    07/20/21 1333 07/20/21 1337   BP: 130/62 126/60   Position: Sitting Standing   Pulse: 60 60   Weight: 207 lb (93.9 kg)    Height: 5' 7\" (1.702 m)       Body mass index is 32.42 kg/m². Wt Readings from Last 3 Encounters:   07/20/21 207 lb (93.9 kg)   06/09/21 212 lb (96.2 kg)   06/08/21 212 lb (96.2 kg)     Constitutional: Moderately obese pleasant male in no apparent distress. Lost 5 pounds. Eyes: He wears glasses pupils are equal.  ENT: wearing face mask otherwise Unremarkable  NECK: No JVP or thyromegaly  Cardiovascular:  Auscultation: Normal S1 and S2.  No murmurs or gallops noted. Carotids are negative for bruits.  Abdominal aorta is normal.  No epigastric bruit noted. Peripheral pulses: 1+ equal  Respiratory:  Respiratory effort is normal. Breath sounds are diminished in the right lower chest.  Extremities: Has bilateral 1+ pitting edema of leg now has compression stockings on. SKIN: Warm and well perfused, no pallor or cyanosis  Abdomen:  No masses or tenderness. No organomegaly noted. Musculoskeletal:  No spinal deformities noted.  Gait is normal  Muscle strength is normal  Neurologic:  Oriented to time, place, and person and non-anxious.  No focal neurological deficit noted.  Psychiatric: Normal mood and effect.     EKG today is consistent with normal sinus rhythm 60 bpm poor R wave progression consistent with old anterior wall infarction. Pertinent records reviewed and discussed with patient and results are as follow:    Lab Results   Component Value Date    WBC 6.2 05/10/2021    HGB 12.1 05/10/2021    HCT 35.0 05/10/2021     05/10/2021     Lab Results   Component Value Date    CHOL 97 09/03/2020    TRIG 83 09/03/2020    HDL 41 09/03/2020    LDLCALC 39 09/03/2020     Lab Results   Component Value Date    BUN 15 05/10/2021    CREATININE 1.1 05/10/2021     05/10/2021    K 4.7 05/10/2021     Lab Results   Component Value Date    INR 0.93 04/18/2012     Lab Results   Component Value Date    LABA1C 8.2 05/10/2021     ASSESSMENT/PLAN:    1. Hypertension, unspecified type  -     EKG 12 lead; Future  2. Leg swelling  3. Dizzy spells  4. S/P CABG + TMR 2004    Decrease Coreg to 6.25 mg every 12 hours. If dizziness is not improving to be seen he is to call us for cardiac monitoring otherwise to continue all other medications. Counseled to elevate feet when resting and when resting in bed at night with pillows underneath and use thigh high compression stockings in the morning and remove them at night. Patient verbalizes understanding. Questions answered. Follow-up in 6 months, sooner if needed. An electronic signature was used to authenticate this note.     --Lindbergh Homans, MD

## 2021-09-16 ENCOUNTER — PROCEDURE VISIT (OUTPATIENT)
Dept: PULMONOLOGY | Age: 76
End: 2021-09-16
Payer: MEDICARE

## 2021-09-16 DIAGNOSIS — G47.30 SLEEP APNEA, UNSPECIFIED TYPE: ICD-10-CM

## 2021-09-16 DIAGNOSIS — G47.33 OBSTRUCTIVE SLEEP APNEA SYNDROME: ICD-10-CM

## 2021-09-16 DIAGNOSIS — J44.9 COPD, MILD (HCC): Primary | ICD-10-CM

## 2021-09-16 DIAGNOSIS — J98.6 DIAPHRAGM PARALYSIS: ICD-10-CM

## 2021-09-16 DIAGNOSIS — R06.02 SOB (SHORTNESS OF BREATH) ON EXERTION: ICD-10-CM

## 2021-09-16 LAB
EXPIRATORY TIME-POST: NORMAL
EXPIRATORY TIME: NORMAL
FEF 25-75% %CHNG: NORMAL
FEF 25-75% %PRED-POST: NORMAL
FEF 25-75% %PRED-PRE: NORMAL
FEF 25-75% PRED: NORMAL
FEF 25-75%-POST: NORMAL
FEF 25-75%-PRE: NORMAL
FEV1 %PRED-POST: 50.3 %
FEV1 %PRED-PRE: 51.1 %
FEV1 PRED: 2.61 L
FEV1-POST: 1.31 L
FEV1-PRE: 1.34 L
FEV1/FVC %PRED-POST: 104.6 %
FEV1/FVC %PRED-PRE: 101.6 %
FEV1/FVC PRED: 72.1 %
FEV1/FVC-POST: 75.4 %
FEV1/FVC-PRE: 73.2 %
FVC %PRED-POST: 47.6 L
FVC %PRED-PRE: 49.8 %
FVC PRED: 3.66 L
FVC-POST: 1.74 L
FVC-PRE: 1.82 L
PEF %PRED-POST: NORMAL
PEF %PRED-PRE: NORMAL
PEF PRED: NORMAL
PEF%CHNG: NORMAL
PEF-POST: NORMAL
PEF-PRE: NORMAL

## 2021-09-16 PROCEDURE — 99213 OFFICE O/P EST LOW 20 MIN: CPT | Performed by: INTERNAL MEDICINE

## 2021-09-16 ASSESSMENT — PULMONARY FUNCTION TESTS
FEV1_PREDICTED: 2.61
FVC_PREDICTED: 3.66
FEV1_PERCENT_PREDICTED_POST: 50.3
FEV1_POST: 1.31
FEV1_PERCENT_PREDICTED_PRE: 51.1
FEV1/FVC_PERCENT_PREDICTED_PRE: 101.6
FEV1/FVC_PRE: 73.2
FVC_PERCENT_PREDICTED_PRE: 49.8
FEV1/FVC_PERCENT_PREDICTED_POST: 104.6
FEV1_PRE: 1.34
FVC_PERCENT_PREDICTED_POST: 47.6
FEV1/FVC_PREDICTED: 72.1
FEV1/FVC_POST: 75.4
FVC_PRE: 1.82
FVC_POST: 1.74

## 2021-09-16 NOTE — PROGRESS NOTES
CHIEF COMPLIANT:  Roxanna Barboza presents to the pulmonary clinic today for evaluation, spirometry testing, review of testing results and pulmonary medications. His major complaint today is obstructive sleep apnea, mild COPD, diaphragm paralysis, shortness of breath    HPI: Mr. Rita Dominguez states that he still notes some mild dyspnea exertion but has had no bronchitic infections. He has an albuterol ask inhaler to use as needed. He continues to wear CPAP around 8 h per night and continues get an excellent clinical benefit with less daytime sleepiness and improve daytime energy. He states that he did receive both Moderna COVID-19 vaccinations with the last one on 2/18/2021. He has had no known COVID-19 exposure or infection. Physical Exam:  Constitutional:  He appears well developed and well-nourished. Respiratory: No acute respiratory distress noted  Neurologic: Oriented x3, normal speech    OFFICE SPIROMETRY:  Roxanna Barboza demonstrates an FEV1 of 1.34 L with an FVC of 1.82 liters. He demonstrates a mild obstructive lung defect. He shows no significant response to bronchodilators. Overall, his lung function has remained stable over the past year. ASSESSMENT:    1. COPD, mild ([address]    2. Diaphragm paralysis    3. Sleep apnea, unspecified type    4. SOB (shortness of breath) on exertion          PLAN:   He mentions that he is going to get the flu vaccine today and will probably get the COVID-19 booster vaccination if it becomes available to him later this fall. I will make no changes current bronchodilator therapy. I will continue to follow him    We have discussed the need to maintain yearly flu immunization, pneumococcal vaccination and coronavirus vaccination. We have discussed Coronavirus precaution including handwashing practice, wiping items touched in public such as gas pumps, door handles, shopping carts, etc. Self monitoring for infection - fever, chills, cough, SOB.  Should they develop symptoms they should call office for further instructions. Return in about 12 weeks (around 12/9/2021) for Recheck for Obstructive Sleep Apnea, Recheck for COPD. This dictation was performed with a verbal recognition program and it was checked for errors. It is possible that there are still dictated errors within this office note. Any errors should be brought immediately to my attention for correction. All efforts were made to ensure that this office note is accurate.

## 2021-11-08 LAB
ALBUMIN SERPL-MCNC: 3.6 G/DL
ALP BLD-CCNC: 57 U/L
ALT SERPL-CCNC: 19 U/L
ANION GAP SERPL CALCULATED.3IONS-SCNC: NORMAL MMOL/L
AST SERPL-CCNC: 20 U/L
BILIRUB SERPL-MCNC: 0.7 MG/DL (ref 0.1–1.4)
BUN BLDV-MCNC: NORMAL MG/DL
CALCIUM SERPL-MCNC: NORMAL MG/DL
CHLORIDE BLD-SCNC: 107 MMOL/L
CO2: NORMAL
CREAT SERPL-MCNC: NORMAL MG/DL
GFR CALCULATED: NORMAL
GLUCOSE BLD-MCNC: 165 MG/DL
POTASSIUM SERPL-SCNC: 4.9 MMOL/L
SODIUM BLD-SCNC: 141 MMOL/L
TOTAL PROTEIN: NORMAL

## 2021-11-10 LAB
CHOLESTEROL, TOTAL: 103 MG/DL
CHOLESTEROL/HDL RATIO: NORMAL
HDLC SERPL-MCNC: 41 MG/DL (ref 35–70)
LDL CHOLESTEROL CALCULATED: 51 MG/DL (ref 0–160)
NONHDLC SERPL-MCNC: NORMAL MG/DL
TRIGL SERPL-MCNC: 54 MG/DL
VLDLC SERPL CALC-MCNC: NORMAL MG/DL

## 2021-12-09 ENCOUNTER — OFFICE VISIT (OUTPATIENT)
Dept: PULMONOLOGY | Age: 76
End: 2021-12-09
Payer: MEDICARE

## 2021-12-09 VITALS
BODY MASS INDEX: 31.71 KG/M2 | DIASTOLIC BLOOD PRESSURE: 58 MMHG | HEART RATE: 67 BPM | WEIGHT: 202 LBS | SYSTOLIC BLOOD PRESSURE: 130 MMHG | OXYGEN SATURATION: 98 % | HEIGHT: 67 IN

## 2021-12-09 DIAGNOSIS — G47.33 OBSTRUCTIVE SLEEP APNEA SYNDROME: ICD-10-CM

## 2021-12-09 DIAGNOSIS — J44.9 COPD, MILD (HCC): Primary | ICD-10-CM

## 2021-12-09 DIAGNOSIS — U07.1 COVID-19 VIRUS INFECTION: ICD-10-CM

## 2021-12-09 DIAGNOSIS — R06.02 SOB (SHORTNESS OF BREATH) ON EXERTION: ICD-10-CM

## 2021-12-09 PROCEDURE — 99213 OFFICE O/P EST LOW 20 MIN: CPT | Performed by: INTERNAL MEDICINE

## 2021-12-09 RX ORDER — ALBUTEROL SULFATE 90 UG/1
2 POWDER, METERED RESPIRATORY (INHALATION) EVERY 4 HOURS PRN
Qty: 1 EACH | Refills: 11 | Status: SHIPPED | OUTPATIENT
Start: 2021-12-09

## 2021-12-09 NOTE — PROGRESS NOTES
SUBJECTIVE:  Chief Complaint: Mild COPD, mild dyspnea exertion, diaphragm paralysis, obstructive sleep apnea on CPAP  Sybil Goodell states that before he get the booster COVID-19 vaccination he did develop acute COVID-19 infection. He noted the onset of bronchitis-like symptoms with low-grade fever, chills, loss of energy, loss of taste and smell. He did go to the emergency room and received monoclonal antibody but did not require hospitalization and was not told he had COVID-19 pneumonia. He has recovered and states that he is got most of his energy back and does have his sense of taste and smell back. He continues wear CPAP around 6 to 7 hours per night and continues to get a good clinical benefit with less daytime sleepiness and improve daytime energy. He denies any other recent bronchitic infections. He does continue on albuterol rescue inhaler 2 puffs as needed but is not on long-acting bronchodilators. He has not received the COVID-19 booster vaccination yet      ROS:  Constitution:  HEENT: Negative for ear, throat pain  Cardiovascular: Negative for chest pain, syncope, edema  Pulmonary: See HPI  Musculoskeletal: Negative for DVT, myalgias, arthralgias    OBJECTIVE:  BP (!) 130/58   Pulse 67   Ht 5' 7\" (1.702 m)   Wt 202 lb (91.6 kg)   SpO2 98%   BMI 31.64 kg/m²      Physical Exam:  Constitutional:  He appears well developed and well-nourished. Neck:  Supple, No palpable lymphadenopathy, No JVD  Cardiovascular:  S1, S2 Normal, Regular rhythm, no murmurs or gallops, No pericardial  rubs.   Pulmonary: Clear breath sounds throughout all areas without wheezing or rhonchi  Abdomen: Not examined  Extremities: no edema, No DVT  Neurologic: Oriented x3, No focal deficits    Radiology: Last chest x-ray I have available is from 3/7/2019 which showed no active cardiopulmonary disease  PFT: Office spirometry on 9/16/2021 demonstrated a mild obstructive lung defect with no significant response of bronchodilators and overall his lung function had remained stable over the previous year      Echocardiogram: No recent echo available    ASSESSMENT:    1. COPD, mild ([address]    2. SOB (shortness of breath) on exertion    3. Obstructive sleep apnea syndrome    4. COVID-19 virus infection          PLAN:   I will make no change in his current bronchodilator therapy. I did renew his albuterol rescue inhaler. I recommended he get the COVID-19 booster vaccination 3 months after his monoclonal antibody infusion for active COVID-19 infection. I will continue to follow him    We have discussed the need to maintain yearly flu immunization, pneumococcal vaccination. We have discussed Coronavirus precaution including handwashing practice, wiping items touched in public such as gas pumps, door handles, shopping carts, etc. Self monitoring for infection - fever, chills, cough, SOB. Should they develop symptoms they should call office for further instructions. Return in about 6 months (around 6/9/2022) for Recheck for COPD, Recheck for Shortness of Breath, Recheck for Obstructive Sleep Apnea. This dictation was performed with a verbal recognition program and it was checked for errors. It is possible that there are still dictated errors within this office note. Any errors should be brought immediately to my attention for correction. All efforts were made to ensure that this office note is accurate.

## 2022-04-04 ENCOUNTER — OFFICE VISIT (OUTPATIENT)
Dept: CARDIOLOGY CLINIC | Age: 77
End: 2022-04-04
Payer: MEDICARE

## 2022-04-04 VITALS
BODY MASS INDEX: 32.18 KG/M2 | WEIGHT: 205 LBS | DIASTOLIC BLOOD PRESSURE: 70 MMHG | HEART RATE: 60 BPM | SYSTOLIC BLOOD PRESSURE: 132 MMHG | HEIGHT: 67 IN

## 2022-04-04 DIAGNOSIS — E66.9 OBESITY (BMI 30.0-34.9): ICD-10-CM

## 2022-04-04 DIAGNOSIS — E08.00 DIABETES MELLITUS DUE TO UNDERLYING CONDITION WITH HYPEROSMOLARITY WITHOUT COMA, WITH LONG-TERM CURRENT USE OF INSULIN (HCC): ICD-10-CM

## 2022-04-04 DIAGNOSIS — Z95.1 S/P CABG (CORONARY ARTERY BYPASS GRAFT): Primary | ICD-10-CM

## 2022-04-04 DIAGNOSIS — Z79.4 DIABETES MELLITUS DUE TO UNDERLYING CONDITION WITH HYPEROSMOLARITY WITHOUT COMA, WITH LONG-TERM CURRENT USE OF INSULIN (HCC): ICD-10-CM

## 2022-04-04 DIAGNOSIS — I10 ESSENTIAL HYPERTENSION: ICD-10-CM

## 2022-04-04 DIAGNOSIS — E78.00 PURE HYPERCHOLESTEROLEMIA: ICD-10-CM

## 2022-04-04 PROCEDURE — 99214 OFFICE O/P EST MOD 30 MIN: CPT | Performed by: INTERNAL MEDICINE

## 2022-04-04 RX ORDER — LOSARTAN POTASSIUM 25 MG/1
25 TABLET ORAL DAILY
Qty: 90 TABLET | Refills: 3 | Status: SHIPPED | OUTPATIENT
Start: 2022-04-04

## 2022-04-04 RX ORDER — CARVEDILOL 12.5 MG/1
6.25 TABLET ORAL 2 TIMES DAILY WITH MEALS
Qty: 180 TABLET | Refills: 3 | Status: SHIPPED | OUTPATIENT
Start: 2022-04-04

## 2022-04-04 NOTE — PROGRESS NOTES
Amari Mcdowell (:  1945) is a 67 y.o. male,     Chief Complaint   Patient presents with    Cardiomyopathy     OV for 6 month check. Pt denies any chest pain,SOB,dizziness,or palpitations. He does have some swelling to left ankle.  Coronary Artery Disease    Hypertension    Hyperlipidemia     Patient is here for follow up for known coronary artery disease status post bypass surgery in  and has hypertension and hyperlipidemia and insulin-dependent diabetes. Denies any cardiac symptoms today. He contracted Covid infection in October last year with mild symptoms. Has taken vaccinations prior to the infection and booster after the infection. He was also given antibiotics infusion. He also had a fall slipped on ice requiring emergency room visit to [facility] and had a hematoma on his head. Did not sustain any intracranial bleeding or injuries. He had momentary loss of consciousness. Denies any other symptoms. Medications are reviewed. He gets his medication from [facility] and had labs done since last visit and he brought those labs for us to review. He is walking and exercising and a facility where he lives in the apartments for seniors in [place]. He uses recumbent bike and treadmill for exercises and denies any chest pains or unusual shortness of breath. Allergies   Allergen Reactions    Lisinopril Other (See Comments)     cough     Prior to Admission medications    Medication Sig Start Date End Date Taking?  Authorizing Provider   albuterol sulfate (PROAIR RESPICLICK) 583 (90 Base) MCG/ACT aerosol powder inhalation Inhale 2 puffs into the lungs every 4 hours as needed for Wheezing or Shortness of Breath 21  Yes Edyth Severe, MD   carvedilol (COREG) 12.5 MG tablet Take 0.5 tablets by mouth 2 times daily (with meals) 21  Yes Kirk Ordonez MD   Compression Stockings MISC by Does not apply route 20-30 mmg thigh high stockings wear daily when up and remove at night at bedtime. 7/20/21  Yes Wade Nolen MD   simvastatin (ZOCOR) 20 MG tablet TAKE 1 TABLET BY MOUTH AT BEDTIME 5/16/21  Yes Historical Provider, MD   Compression Stockings MISC by Does not apply route 20-30 mmg thigh high stockings wear daily when up and remove at night at bedtime. 6/8/21  Yes Wade Nolen MD   acetaminophen (8 HR ARTHRITIS PAIN RELIEF) 650 MG extended release tablet Take 650 mg by mouth as needed for Pain   Yes Historical Provider, MD   BiPAP Machine MISC by Does not apply route nightly   Yes Historical Provider, MD   Cyanocobalamin (VITAMIN B12 PO) Take 2,500 mcg by mouth   Yes Historical Provider, MD   Multiple Vitamins-Minerals (THERAPEUTIC MULTIVITAMIN-MINERALS) tablet Take 1 tablet by mouth daily   Yes Historical Provider, MD   Lutein 20 MG CAPS Take by mouth   Yes Historical Provider, MD   ascorbic acid (VITAMIN C) 500 MG tablet Take 500 mg by mouth daily   Yes Historical Provider, MD   Coenzyme Q10 (CO Q 10 PO) Take 200 mg by mouth daily. Yes Historical Provider, MD   levothyroxine (SYNTHROID) 88 MCG tablet Take 88 mcg by mouth Daily. Yes Historical Provider, MD   metformin (GLUCOPHAGE) 1000 MG tablet Take 1,000 mg by mouth 2 times daily (with meals). Yes Historical Provider, MD   insulin aspart (NOVOLOG) 100 UNIT/ML injection Inject 4 Units into the skin 3 times daily (before meals)    Yes Historical Provider, MD   insulin glargine (LANTUS) 100 UNIT/ML injection Inject  into the skin daily. Indications: 36 units daily   Yes Historical Provider, MD   aspirin 81 MG chewable tablet Take 81 mg by mouth daily. Yes Historical Provider, MD   losartan (COZAAR) 25 MG tablet Take 25 mg by mouth daily.      Yes Historical Provider, MD     Past Medical History:   Diagnosis Date    Anemia     Asthma     CAD (coronary artery disease) 06/2004    Cardi[facility] [address]     Cataract     COPD, mild ([facility] [address] 03/30/2016    COVID-19 virus infection 12/9/2021    Diabetes mellitus ([facility] [address] 1990    Onset @ age 51. 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 LV perfusion is abnormal consistent w/anterior apical infarction without ischemia, normal LV size, wall motion and systolic function, EF 13%.  H/O cardiovascular stress test 3/11/2003  muga    mild lv dysfxn at rest with appropriate but less than optimal response to exercise    H/O chest x-ray 07/27/2004    increased density in the lll is again noticed either due to atelectasis or fibrosis    H/O Doppler ultrasound 06/22/2021    Venous-Evidence of Chronic, non occlusive SVT of the left SSV from prox to distal calf. Significant reflux noted of the Right Popliteal vein and the GSV at the  level of mid thigh (1.3s) and distal calf (1.4s). No significant reflux noted in the veins of the left lower extremity.  H/O echocardiogram 04/18/2012    EF=50-55% & Trace tricuspid regurg. 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 Wt Readings from Last 3 Encounters:   04/04/22 205 lb (93 kg)   12/09/21 202 lb (91.6 kg)   07/20/21 207 lb (93.9 kg)     Constitutional: Moderately obese pleasant male in no apparent distress. Lost 2 pounds. Eyes:  He wears glasses pupils are equal.  ENT: wearing face mask otherwise Unremarkable  NECK: No JVP or thyromegaly  Cardiovascular:  Auscultation: Normal S1 and S2.  No murmurs or gallops noted. Carotids are negative for bruits.  Abdominal aorta is normal.  No epigastric bruit noted. Peripheral pulses: 1+ equal  Respiratory:  Respiratory effort is normal. Breath sounds are diminished in the right lower chest.  Extremities: Has bilateral 1+ pitting edema of leg now has compression stockings on. SKIN: Warm and well perfused, no pallor or cyanosis  Abdomen:  No masses or tenderness. No organomegaly noted. Musculoskeletal:  No spinal deformities noted.  Gait is normal  Muscle strength is normal  Neurologic:  Oriented to time, place, and person and non-anxious. No focal neurological deficit noted. Psychiatric: Normal mood and effect.     Pertinent records reviewed and discussed with patient and results are as follow:    Lab Results   Component Value Date    WBC 6.2 05/10/2021    HGB 12.1 05/10/2021    HCT 35.0 05/10/2021     05/10/2021     Lab Results   Component Value Date    CHOL 103 11/10/2021    TRIG 54 11/10/2021    HDL 41 11/10/2021    LDLCALC 51 11/10/2021     Lab Results   Component Value Date    BUN 15 05/10/2021    CREATININE 1.1 05/10/2021     11/08/2021    K 4.9 11/08/2021     Lab Results   Component Value Date    INR 0.93 04/18/2012     Lab Results   Component Value Date    LABA1C 8.2 05/10/2021     ASSESSMENT/PLAN:    1. S/P CABG + TMR 2004  2. Diabetes mellitus due to underlying condition with hyperosmolarity without coma, with long-term current use of insulin ([facility])  3. Pure hypercholesterolemia  4. Essential hypertension  5. Obesity (BMI 30.0-34. 9)    Continue current cardiovascular medications which have been reviewed and discussed individually with you. Primary/secondary prevention is the goal by aggressive risk modification, healthy and therapeutic life style changes for cardiovascular risk reduction. Various goals are discussed and questions answered.   Follow-up in 6 months with EKG, sooner if needed. An electronic signature was used to authenticate this note.     --Viji Francisco MD

## 2022-04-04 NOTE — PATIENT INSTRUCTIONS
Continue current cardiovascular medications which have been reviewed and discussed individually with you. Primary/secondary prevention is the goal by aggressive risk modification, healthy and therapeutic life style changes for cardiovascular risk reduction. Various goals are discussed and questions answered. Follow-up in 6 months, sooner if needed.

## 2022-05-10 LAB
ALBUMIN SERPL-MCNC: 3.4 G/DL
ALP BLD-CCNC: 57 U/L
ALT SERPL-CCNC: 21 U/L
AST SERPL-CCNC: 23 U/L
BASOPHILS ABSOLUTE: 0 /ΜL
BASOPHILS RELATIVE PERCENT: 0.7 %
BILIRUB SERPL-MCNC: 0.7 MG/DL (ref 0.1–1.4)
BUN BLDV-MCNC: 19 MG/DL
CALCIUM SERPL-MCNC: 9.7 MG/DL
CHLORIDE BLD-SCNC: 103 MMOL/L
CHOLESTEROL, TOTAL: 90 MG/DL
CHOLESTEROL/HDL RATIO: ABNORMAL
CO2: 28 MMOL/L
CREAT SERPL-MCNC: 1.1 MG/DL
EOSINOPHILS ABSOLUTE: 0.3 /ΜL
EOSINOPHILS RELATIVE PERCENT: 4.1 %
GLUCOSE FASTING: 135 MG/DL
HCT VFR BLD CALC: 35.3 % (ref 41–53)
HDLC SERPL-MCNC: 37 MG/DL (ref 35–70)
HEMOGLOBIN: 11.7 G/DL (ref 13.5–17.5)
LDL CHOLESTEROL CALCULATED: ABNORMAL
LYMPHOCYTES ABSOLUTE: 1.4 /ΜL
LYMPHOCYTES RELATIVE PERCENT: 22.3 %
MCH RBC QN AUTO: 33.3 PG
MCHC RBC AUTO-ENTMCNC: 33 G/DL
MCV RBC AUTO: 100.9 FL
MONOCYTES ABSOLUTE: 0.6 /ΜL
MONOCYTES RELATIVE PERCENT: 8.7 %
NEUTROPHILS ABSOLUTE: 4.1 /ΜL
NEUTROPHILS RELATIVE PERCENT: 64.2 %
NONHDLC SERPL-MCNC: ABNORMAL MG/DL
PLATELET # BLD: 173 K/ΜL
PMV BLD AUTO: 8.8 FL
POTASSIUM SERPL-SCNC: 5.4 MMOL/L
RBC # BLD: 3.5 10^6/ΜL
SODIUM BLD-SCNC: 140 MMOL/L
TOTAL PROTEIN: 6.5 G/DL (ref 6.4–8.2)
TRIGL SERPL-MCNC: 54 MG/DL
VLDLC SERPL CALC-MCNC: ABNORMAL MG/DL
WBC # BLD: 6.4 10^3/ML

## 2022-06-09 ENCOUNTER — OFFICE VISIT (OUTPATIENT)
Dept: PULMONOLOGY | Age: 77
End: 2022-06-09
Payer: MEDICARE

## 2022-06-09 VITALS
BODY MASS INDEX: 32.18 KG/M2 | WEIGHT: 205 LBS | OXYGEN SATURATION: 95 % | HEART RATE: 53 BPM | DIASTOLIC BLOOD PRESSURE: 64 MMHG | SYSTOLIC BLOOD PRESSURE: 126 MMHG | HEIGHT: 67 IN

## 2022-06-09 DIAGNOSIS — J44.9 COPD, MILD (HCC): Primary | ICD-10-CM

## 2022-06-09 DIAGNOSIS — G47.33 OBSTRUCTIVE SLEEP APNEA SYNDROME: ICD-10-CM

## 2022-06-09 PROCEDURE — 99213 OFFICE O/P EST LOW 20 MIN: CPT | Performed by: INTERNAL MEDICINE

## 2022-06-09 PROCEDURE — 1123F ACP DISCUSS/DSCN MKR DOCD: CPT | Performed by: INTERNAL MEDICINE

## 2022-06-09 NOTE — PROGRESS NOTES
SUBJECTIVE:  Chief Complaint: Obstructive sleep apnea on CPAP, mild COPD, mild dyspnea on exertion, diaphragm paralysis  Charlene Marques states that he is fully recovered from his COVID-19 infection he had last year. Since he has infection he did get the first booster and is planning on getting a second later this summer. He continues wear CPAP around 6 to 7 hours a night and continues to get a good clinical benefit with less daytime sleepiness and improve daytime energy. He states that he rarely has to use his albuterol rescue inhaler and has had no episodes of bronchitis or asthmatic exacerbation. He also denies any worsening shortness of breath      ROS:  Constitution:  HEENT: Negative for ear, throat pain  Cardiovascular: Negative for chest pain, syncope, edema  Pulmonary: See HPI  Musculoskeletal: Negative for DVT, myalgias, arthralgias    OBJECTIVE:  /64   Pulse 53   Ht 5' 7\" (1.702 m)   Wt 205 lb (93 kg)   SpO2 95%   BMI 32.11 kg/m²      Physical Exam:  Constitutional:  He appears well developed and well-nourished. In no respiratory distress at rest  Neck:  Supple, No palpable lymphadenopathy, No JVD  Cardiovascular:  S1, S2 Normal, Regular rhythm, no murmurs or gallops, No pericardial  rubs. Pulmonary: Breath sounds are clear throughout all areas without wheezing or rhonchi  Abdomen: Not examined  Extremities: no edema, No DVT  Neurologic: Oriented x3, No focal deficits    Radiology: No recent chest x-ray available and his last chest x-ray on 3/17/2019 showed no active cardiopulmonary disease  PFT: Office spirometry on 9/16/2021 demonstrated a mild obstructive lung defect with no significant response to bronchodilators and overall his lung function remained stable over the previous year      Echocardiogram: No recent echo available    ASSESSMENT:    1. COPD, mild ([address]    2. Obstructive sleep apnea syndrome          PLAN:   I will make no change in his current bronchodilator therapy.   I did encourage him to continue wearing CPAP nightly as he has had an excellent clinical benefit from its use. Because of my anticipated half-way later this summer Dr. Gloria Pierson and [facility] will continue to follow him for his ELLE and CPAP supplies and mask. We have discussed the need to maintain yearly flu immunization, pneumococcal vaccination. We have discussed Coronavirus precaution including handwashing practice, wiping items touched in public such as gas pumps, door handles, shopping carts, etc. Self monitoring for infection - fever, chills, cough, SOB. Should they develop symptoms they should call office for further instructions. Return in about 1 year (around 6/9/2023) for Recheck for Asthma, Recheck for Obstructive Sleep Apnea. This dictation was performed with a verbal recognition program and it was checked for errors. It is possible that there are still dictated errors within this office note. Any errors should be brought immediately to my attention for correction. All efforts were made to ensure that this office note is accurate.

## 2022-10-10 ENCOUNTER — OFFICE VISIT (OUTPATIENT)
Dept: CARDIOLOGY CLINIC | Age: 77
End: 2022-10-10
Payer: MEDICARE

## 2022-10-10 VITALS
WEIGHT: 199 LBS | HEIGHT: 67 IN | SYSTOLIC BLOOD PRESSURE: 118 MMHG | DIASTOLIC BLOOD PRESSURE: 60 MMHG | BODY MASS INDEX: 31.23 KG/M2 | HEART RATE: 58 BPM

## 2022-10-10 DIAGNOSIS — E66.9 OBESITY (BMI 30.0-34.9): ICD-10-CM

## 2022-10-10 DIAGNOSIS — E08.00 DIABETES MELLITUS DUE TO UNDERLYING CONDITION WITH HYPEROSMOLARITY WITHOUT COMA, WITHOUT LONG-TERM CURRENT USE OF INSULIN (HCC): ICD-10-CM

## 2022-10-10 DIAGNOSIS — Z95.1 S/P CABG (CORONARY ARTERY BYPASS GRAFT): Primary | ICD-10-CM

## 2022-10-10 DIAGNOSIS — E78.00 PURE HYPERCHOLESTEROLEMIA: ICD-10-CM

## 2022-10-10 DIAGNOSIS — I10 ESSENTIAL HYPERTENSION: ICD-10-CM

## 2022-10-10 PROCEDURE — 1123F ACP DISCUSS/DSCN MKR DOCD: CPT | Performed by: INTERNAL MEDICINE

## 2022-10-10 PROCEDURE — 99214 OFFICE O/P EST MOD 30 MIN: CPT | Performed by: INTERNAL MEDICINE

## 2022-10-10 PROCEDURE — 93000 ELECTROCARDIOGRAM COMPLETE: CPT | Performed by: INTERNAL MEDICINE

## 2022-10-10 NOTE — ASSESSMENT & PLAN NOTE
Clinically stable from cardiac standpoint continue aggressive risk factor modification for secondary prevention.

## 2022-10-10 NOTE — PATIENT INSTRUCTIONS
Continue current cardiovascular medications which have been reviewed and discussed individually with you. Continue to exercise regularly. Primary/secondary prevention is the goal by aggressive risk modification, healthy and therapeutic life style changes for cardiovascular risk reduction. Various goals are discussed and questions answered. Appropriate prescriptions if needed on this visit are addressed. After visit summery is provided. Questions answered and patient verbalizes understanding. Follow up in 6 months,  sooner if needed.

## 2022-10-10 NOTE — PROGRESS NOTES
Marco A Soria  1945  [place]      Chief Complaint   Patient presents with    Hyperlipidemia    Hypertension    Coronary Artery Disease     Denies any new onset cardiac complaints. For exercise 5-6 days a week does the treadmill for 20 min. Denies caffeine. Non smoker      Chief complaint and HPI:  Marco A Soria  is a 65 y.o. male following up for known coronary artery disease status post bypass surgery in has upper lipidemia and hypertension and insulin-dependent diabetes. He is exercising regularly 4-5 times a week and denies any chest pains or shortness of breath. He is compliant to his medications and his leg swelling has improved significantly. He has broken his front tooth and is going to dentist for follow-up. Rest of the Cardiovascular system review is otherwise unchanged from prior encounter. Past medical history:  has a past medical history of Anemia, Asthma, CAD (coronary artery disease), Cardiomyopathy ([address] Cataract, COPD, mild ([address] COVID-19 virus infection, Diabetes mellitus ([address] Diabetic neuropathy ([address] Diaphragmatic paralysis, H/O cardiovascular stress test, H/O cardiovascular stress test, H/O chest x-ray, H/O Doppler ultrasound, H/O echocardiogram, History of cardiac cath, Hyperlipidemia, Hypertension, Leg swelling, Median nerve compression, MI (myocardial infarction) ([address] Mononeuritis multiplex, Retinal hemorrhage, S/P CABG x 4, Sleep apnea, and SOB (shortness of breath) on exertion. Past surgical history:  has a past surgical history that includes Carpal tunnel release; Finger trigger release; Cataract removal; Abdomen surgery; Appendectomy; and Coronary artery bypass graft (2004).   Social History:   Social History     Tobacco Use    Smoking status: Never    Smokeless tobacco: Never   Substance Use Topics    Alcohol use: No     Alcohol/week: 0.0 standard drinks     Family history: family history includes Cancer in his mother; Diabetes in his paternal aunt and sister. ALLERGIES:  Lisinopril  Prior to Admission medications    Medication Sig Start Date End Date Taking? Authorizing Provider   carvedilol (COREG) 12.5 MG tablet Take 0.5 tablets by mouth 2 times daily (with meals) 4/4/22  Yes Lawyer Alejandro MD   losartan (COZAAR) 25 MG tablet Take 1 tablet by mouth daily 4/4/22  Yes Lawyer Alejandro MD   albuterol sulfate (PROAIR RESPICLICK) 026 (90 Base) MCG/ACT aerosol powder inhalation Inhale 2 puffs into the lungs every 4 hours as needed for Wheezing or Shortness of Breath 12/9/21  Yes Mina Gallardo MD   Compression Stockings MISC by Does not apply route 20-30 mmg thigh high stockings wear daily when up and remove at night at bedtime. 7/20/21  Yes Lawyer Alejandro MD   simvastatin (ZOCOR) 20 MG tablet TAKE 1 TABLET BY MOUTH AT BEDTIME 5/16/21  Yes Historical MD Prema   Compression Stockings MISC by Does not apply route 20-30 mmg thigh high stockings wear daily when up and remove at night at bedtime. 6/8/21  Yes Lawyer Alejandro MD   acetaminophen (TYLENOL) 650 MG extended release tablet Take 650 mg by mouth as needed for Pain   Yes Historical Provider, MD   BiPAP Machine MISC by Does not apply route nightly   Yes Historical Provider, MD   Cyanocobalamin (VITAMIN B12 PO) Take 2,500 mcg by mouth   Yes Historical Provider, MD   Multiple Vitamins-Minerals (THERAPEUTIC MULTIVITAMIN-MINERALS) tablet Take 1 tablet by mouth daily   Yes Historical Provider, MD   Lutein 20 MG CAPS Take by mouth   Yes Historical Provider, MD   ascorbic acid (VITAMIN C) 500 MG tablet Take 500 mg by mouth daily   Yes Historical Provider, MD   Coenzyme Q10 (CO Q 10 PO) Take 200 mg by mouth daily. Yes Historical Provider, MD   levothyroxine (SYNTHROID) 88 MCG tablet Take 88 mcg by mouth Daily. Yes Historical Provider, MD   metformin (GLUCOPHAGE) 1000 MG tablet Take 1,000 mg by mouth 2 times daily (with meals).    Yes Historical Provider, MD   insulin aspart (NOVOLOG) 100 UNIT/ML injection Inject 4 Units into the skin 3 times daily (before meals)    Yes Historical Provider, MD   insulin glargine (LANTUS) 100 UNIT/ML injection Inject  into the skin daily. Indications: 36 units daily   Yes Historical Provider, MD   aspirin 81 MG chewable tablet Take 81 mg by mouth daily. Yes Historical Provider, MD     Vitals:    10/10/22 1042   BP: 118/60   Pulse: 58   Weight: 199 lb (90.3 kg)   Height: 5' 7.2\" (1.707 m)      Body mass index is 30.98 kg/m². Wt Readings from Last 3 Encounters:   10/10/22 199 lb (90.3 kg)   06/09/22 205 lb (93 kg)   04/04/22 205 lb (93 kg)     Constitutional:  Patient is mildly overweight male in no apparent distress. He lost 6 pounds since last visit. Eyes: Wearing glasses and facemask. NECK: No JVP or thyromegaly  Cardiovascular: Auscultation: Normal S1 and S2. No significant murmurs or gallops noted. Carotids are negative for bruits. Respiratory:  Respiratory effort is normal. Breath sounds are clear to auscultation. Extremities:  No edema, clubbing,  Cyanosis, petechiae. SKIN: Warm and well perfused, no pallor or cyanosis  Abdomen:  No masses or tenderness. No organomegaly noted. Neurologic:  Oriented to time, place, and person and non-anxious. No focal neurological deficit noted. Psychiatric: Normal mood and effect. EKG is consistent with sinus rhythm 58 bpm with low QRS voltages in both limb leads and chest leads with diffuse nonspecific ST abnormalities and old anterior infarction. Compared to July 2021 there is no significant change.     LAB REVIEW:  CBC:   Component Ref Range & Units 5/9/22 5/10/21 9/3/20 1/8/20 7/17/19 1/9/19 11/16/17   WBC 10^3/mL 6.4  6.2  7.4  5.9  7.3  6.9  5.7    Hemoglobin 13.5 - 17.5 g/dL 11.7 Low   12.1 Abnormal   11.9 Abnormal   11.8 Abnormal   11.5 Abnormal   11.9 Abnormal   11.5 Abnormal     Hematocrit 41 - 53 % 35.3 Low   35.0 Abnormal   34.0 Abnormal   34.9 Abnormal   32.7 Abnormal   34.6 Abnormal   36.0 Abnormal     Platelets K/µL 173  173  170  184  202  156  172      Lipids:   Component Ref Range & Units 5/9/22 11/10/21 9/3/20 7/17/19 1/9/19 11/16/17 2/7/17   Cholesterol, Total mg/dL 90  103  97  87  81  96  96    HDL 35 - 70 mg/dL 37 Low   41  41  43  41  47  52    LDL Calculated   51 R  39 R  33 R  31 R  38 R  34 R    Triglycerides mg/dL 54  54  83  55  43  53  52      Renal:   Lab Results   Component Value Date/Time    BUN 19 05/09/2022 12:00 AM    CREATININE 1.1 05/09/2022 12:00 AM     05/09/2022 12:00 AM    K 5.4 05/09/2022 12:00 AM     PT/INR:   Lab Results   Component Value Date/Time    INR 0.93 04/18/2012 01:45 AM     Lab Results   Component Value Date    LABA1C 8.2 05/10/2021   Last 6 months ago was 7.3    IMPRESSION and RECOMMENDATIONS:      1. S/P CABG + TMR 2004  Assessment & Plan:  Clinically stable from cardiac standpoint continue aggressive risk factor modification for secondary prevention. 2. Essential hypertension  Assessment & Plan:  Well-controlled on current medications including losartan and carvedilol. Continue Boor. Orders:  -     EKG 12 lead; Future  3. Obesity (BMI 30.0-34. 9)  Assessment & Plan:  Patient has lost some weight. Continue diet and exercise to lose weight. 4. Pure hypercholesterolemia  Assessment & Plan:  Last LDL was 54 on current dose of simvastatin. Continue the same. 5. Diabetes mellitus due to underlying condition with hyperosmolarity without coma, without long-term current use of insulin ([facility])  Assessment & Plan:  He reports his most recent hemoglobin A1c was 7.3 which is an improvement from 8.2 last year. Patient is cleared to have tooth extraction or root canal done and no further cardiac work-up for his special precautions are needed. He has been stable from cardiac standpoint. Continue current cardiovascular medications which have been reviewed and discussed individually with you. Continue to exercise regularly.  Primary/secondary prevention is the goal by aggressive risk modification, healthy and therapeutic life style changes for cardiovascular risk reduction. Various goals are discussed and questions answered. Appropriate prescriptions if needed on this visit are addressed. After visit summery is provided. Questions answered and patient verbalizes understanding. Follow up in 6 months,  sooner if needed. Ty Garvin MD, 10/10/2022 11:11 AM     Please note this report has been partially produced using speech recognition software and may contain errors related to that system including errors in grammar, punctuation, and spelling, as well as words and phrases that may be inappropriate. If there are any questions or concerns please feel free to contact the dictating provider for clarification.

## 2022-11-14 LAB
AVERAGE GLUCOSE: 110
BACTERIA URINE, POC: NORMAL
BILIRUBIN URINE: 0 MG/DL
BLOOD, URINE: NEGATIVE
BUN BLDV-MCNC: 13 MG/DL
CALCIUM SERPL-MCNC: 9.4 MG/DL
CASTS URINE, POC: NORMAL
CHLORIDE BLD-SCNC: 103 MMOL/L
CHOLESTEROL, TOTAL: 94 MG/DL
CHOLESTEROL/HDL RATIO: 2.18
CLARITY: CLEAR
CO2: 30 MMOL/L
COLOR: YELLOW
CREAT SERPL-MCNC: 0.9 MG/DL
CREATININE, URINE: 56.4
CRYSTALS URINE, POC: NORMAL
EPI CELLS URINE, POC: NORMAL
GFR CALCULATED: 86
GLUCOSE BLD-MCNC: 110 MG/DL
GLUCOSE URINE: NORMAL
HBA1C MFR BLD: 7.5 %
HDLC SERPL-MCNC: 43 MG/DL (ref 35–70)
KETONES, URINE: NEGATIVE
LDL CHOLESTEROL CALCULATED: 44 MG/DL (ref 0–160)
LEUKOCYTE EST, POC: NORMAL
MICROALBUMIN/CREAT 24H UR: 9 MG/G{CREAT}
MICROALBUMIN/CREAT UR-RTO: 16
NITRITE, URINE: NEGATIVE
NONHDLC SERPL-MCNC: 51 MG/DL
PH UA: 6 (ref 4.5–8)
POTASSIUM SERPL-SCNC: 4.6 MMOL/L
PROTEIN UA: NEGATIVE
RBC URINE, POC: NORMAL
SODIUM BLD-SCNC: 141 MMOL/L
SPECIFIC GRAVITY UA: 1.01 (ref 1–1.03)
TRIGL SERPL-MCNC: 35 MG/DL
UROBILINOGEN, URINE: NORMAL
VLDLC SERPL CALC-MCNC: NORMAL MG/DL
WBC URINE, POC: NORMAL
YEAST URINE, POC: NORMAL

## 2022-12-13 RX ORDER — ALBUTEROL SULFATE 90 UG/1
2 POWDER, METERED RESPIRATORY (INHALATION) EVERY 4 HOURS PRN
Qty: 1 EACH | Refills: 11 | Status: SHIPPED | OUTPATIENT
Start: 2022-12-13

## 2023-03-21 ENCOUNTER — OFFICE VISIT (OUTPATIENT)
Dept: CARDIOLOGY CLINIC | Age: 78
End: 2023-03-21
Payer: COMMERCIAL

## 2023-03-21 VITALS
BODY MASS INDEX: 28.58 KG/M2 | HEIGHT: 70 IN | HEART RATE: 59 BPM | DIASTOLIC BLOOD PRESSURE: 58 MMHG | WEIGHT: 199.6 LBS | SYSTOLIC BLOOD PRESSURE: 132 MMHG

## 2023-03-21 DIAGNOSIS — I10 ESSENTIAL HYPERTENSION: ICD-10-CM

## 2023-03-21 DIAGNOSIS — Z79.4 DIABETES MELLITUS DUE TO UNDERLYING CONDITION WITH HYPEROSMOLARITY WITHOUT COMA, WITH LONG-TERM CURRENT USE OF INSULIN (HCC): ICD-10-CM

## 2023-03-21 DIAGNOSIS — E78.2 MIXED HYPERLIPIDEMIA: ICD-10-CM

## 2023-03-21 DIAGNOSIS — E08.00 DIABETES MELLITUS DUE TO UNDERLYING CONDITION WITH HYPEROSMOLARITY WITHOUT COMA, WITH LONG-TERM CURRENT USE OF INSULIN (HCC): ICD-10-CM

## 2023-03-21 DIAGNOSIS — E66.9 OBESITY (BMI 30.0-34.9): ICD-10-CM

## 2023-03-21 DIAGNOSIS — Z95.1 S/P CABG (CORONARY ARTERY BYPASS GRAFT): Primary | ICD-10-CM

## 2023-03-21 PROCEDURE — 1123F ACP DISCUSS/DSCN MKR DOCD: CPT | Performed by: INTERNAL MEDICINE

## 2023-03-21 PROCEDURE — 3078F DIAST BP <80 MM HG: CPT | Performed by: INTERNAL MEDICINE

## 2023-03-21 PROCEDURE — 99214 OFFICE O/P EST MOD 30 MIN: CPT | Performed by: INTERNAL MEDICINE

## 2023-03-21 PROCEDURE — 3075F SYST BP GE 130 - 139MM HG: CPT | Performed by: INTERNAL MEDICINE

## 2023-03-21 PROCEDURE — 93000 ELECTROCARDIOGRAM COMPLETE: CPT | Performed by: INTERNAL MEDICINE

## 2023-03-21 RX ORDER — CARVEDILOL 12.5 MG/1
6.25 TABLET ORAL 2 TIMES DAILY WITH MEALS
Qty: 180 TABLET | Refills: 3 | Status: SHIPPED | OUTPATIENT
Start: 2023-03-21

## 2023-03-21 RX ORDER — LOSARTAN POTASSIUM 25 MG/1
25 TABLET ORAL DAILY
Qty: 90 TABLET | Refills: 3 | Status: SHIPPED | OUTPATIENT
Start: 2023-03-21

## 2023-03-21 NOTE — PATIENT INSTRUCTIONS
Continue current cardiovascular medications which have been reviewed and discussed individually with you. Counseled for 30 minutes a day of moderate intensity aerobic exercise like activity. Primary/secondary prevention is the goal by aggressive risk modification, healthy and therapeutic life style changes for cardiovascular risk reduction. Various goals are discussed and questions answered. Appropriate prescriptions if needed on this visit are addressed. After visit summery is provided. Questions answered and patient verbalizes understanding. Follow up in 6 months,  sooner if needed.

## 2023-03-21 NOTE — ASSESSMENT & PLAN NOTE
Well-controlled on combination of medications he is on including losartan and carvedilol. Continue both.

## 2023-03-21 NOTE — PROGRESS NOTES
Compression Stockings MISC by Does not apply route 20-30 mmg thigh high stockings wear daily when up and remove at night at bedtime. Patient not taking: Reported on 3/21/2023 6/8/21   Merlinda Ina, MD     Vitals:    03/21/23 1428   BP: (!) 132/58   Pulse: 59   Weight: 199 lb 9.6 oz (90.5 kg)   Height: 5' 9.6\" (1.768 m)      Body mass index is 28.97 kg/m². Wt Readings from Last 3 Encounters:   03/21/23 199 lb 9.6 oz (90.5 kg)   10/10/22 199 lb (90.3 kg)   06/09/22 205 lb (93 kg)     Constitutional:  Patient is mildly overweight male in no apparent distress. Eyes: Wearing glasses and facemask. NECK: No JVP or thyromegaly  Cardiovascular: Auscultation: Normal S1 and S2. No significant murmurs or gallops noted. Carotids are negative for bruits. Respiratory:  Respiratory effort is normal. Breath sounds are clear to auscultation. Breath sounds are significantly diminished in the right lower posterior chest compared to left side due to diaphragmatic paralysis. Extremities:  Trace pitting edema on right and 1+ on the left leg. SKIN: Warm and well perfused, no pallor or cyanosis  Neurologic:  Oriented to time, place, and person and non-anxious. No focal neurological deficit noted. Psychiatric: Normal mood and effect. EKG today is consistent with sinus bradycardia 59 bpm evidence of old interval infarction and T wave inversions are noted in lateral leads. Compared to previous EKG on 10/10/2022 there is no change.     LAB REVIEW:  CBC:   Lab Results   Component Value Date/Time    WBC 6.4 05/09/2022 12:00 AM    HGB 11.7 05/09/2022 12:00 AM    HCT 35.3 05/09/2022 12:00 AM     05/09/2022 12:00 AM     Lipids:   Lab Results   Component Value Date    CHOL 94 11/14/2022    TRIG 35 11/14/2022    HDL 43 11/14/2022    LDLCALC 44 11/14/2022     Renal:   Lab Results   Component Value Date/Time    BUN 13 11/14/2022 12:00 AM    CREATININE 0.9 11/14/2022 12:00 AM     11/14/2022 12:00 AM    K 4.6 11/14/2022

## 2023-04-25 LAB
CHOLESTEROL, TOTAL: 90 MG/DL
CHOLESTEROL/HDL RATIO: NORMAL
HDLC SERPL-MCNC: 43 MG/DL (ref 35–70)
LDL CHOLESTEROL CALCULATED: 34 MG/DL (ref 0–160)
NONHDLC SERPL-MCNC: 47 MG/DL
TRIGL SERPL-MCNC: 66 MG/DL
VLDLC SERPL CALC-MCNC: NORMAL MG/DL

## 2023-05-02 LAB
AVERAGE GLUCOSE: 200
HBA1C MFR BLD: NORMAL %

## 2023-06-05 ENCOUNTER — OFFICE VISIT (OUTPATIENT)
Dept: PULMONOLOGY | Age: 78
End: 2023-06-05
Payer: COMMERCIAL

## 2023-06-05 VITALS
DIASTOLIC BLOOD PRESSURE: 64 MMHG | WEIGHT: 196 LBS | SYSTOLIC BLOOD PRESSURE: 128 MMHG | RESPIRATION RATE: 16 BRPM | OXYGEN SATURATION: 99 % | HEART RATE: 86 BPM | BODY MASS INDEX: 28.06 KG/M2 | HEIGHT: 70 IN

## 2023-06-05 DIAGNOSIS — G47.33 OBSTRUCTIVE SLEEP APNEA SYNDROME: ICD-10-CM

## 2023-06-05 DIAGNOSIS — J98.6 DIAPHRAGM PARALYSIS: ICD-10-CM

## 2023-06-05 DIAGNOSIS — J44.9 COPD, MILD (HCC): Primary | ICD-10-CM

## 2023-06-05 PROCEDURE — 3078F DIAST BP <80 MM HG: CPT | Performed by: INTERNAL MEDICINE

## 2023-06-05 PROCEDURE — 99213 OFFICE O/P EST LOW 20 MIN: CPT | Performed by: INTERNAL MEDICINE

## 2023-06-05 PROCEDURE — 1123F ACP DISCUSS/DSCN MKR DOCD: CPT | Performed by: INTERNAL MEDICINE

## 2023-06-05 PROCEDURE — 3074F SYST BP LT 130 MM HG: CPT | Performed by: INTERNAL MEDICINE

## 2023-06-05 NOTE — PROGRESS NOTES
immunization, pneumococcal vaccination. We have discussed Coronavirus precaution including handwashing practice, wiping items touched in public such as gas pumps, door handles, shopping carts, etc. Self monitoring for infection - fever, chills, cough, SOB. Should they develop symptoms they should call office for further instructions. Return in about 1 year (around 6/5/2024) for Recheck for COPD, Recheck for Obstructive Sleep Apnea. This dictation was performed with a verbal recognition program and it was checked for errors. It is possible that there are still dictated errors within this office note. Any errors should be brought immediately to my attention for correction. All efforts were made to ensure that this office note is accurate.

## 2023-09-26 ENCOUNTER — OFFICE VISIT (OUTPATIENT)
Dept: CARDIOLOGY CLINIC | Age: 78
End: 2023-09-26
Payer: COMMERCIAL

## 2023-09-26 VITALS
BODY MASS INDEX: 27.3 KG/M2 | HEART RATE: 61 BPM | SYSTOLIC BLOOD PRESSURE: 118 MMHG | WEIGHT: 195 LBS | HEIGHT: 71 IN | DIASTOLIC BLOOD PRESSURE: 64 MMHG

## 2023-09-26 DIAGNOSIS — E78.00 PURE HYPERCHOLESTEROLEMIA: ICD-10-CM

## 2023-09-26 DIAGNOSIS — I10 ESSENTIAL HYPERTENSION: ICD-10-CM

## 2023-09-26 DIAGNOSIS — E08.00 DIABETES MELLITUS DUE TO UNDERLYING CONDITION WITH HYPEROSMOLARITY WITHOUT COMA, WITHOUT LONG-TERM CURRENT USE OF INSULIN (HCC): ICD-10-CM

## 2023-09-26 DIAGNOSIS — Z95.1 S/P CABG (CORONARY ARTERY BYPASS GRAFT): Primary | ICD-10-CM

## 2023-09-26 PROCEDURE — 1123F ACP DISCUSS/DSCN MKR DOCD: CPT | Performed by: INTERNAL MEDICINE

## 2023-09-26 PROCEDURE — 3074F SYST BP LT 130 MM HG: CPT | Performed by: INTERNAL MEDICINE

## 2023-09-26 PROCEDURE — 99214 OFFICE O/P EST MOD 30 MIN: CPT | Performed by: INTERNAL MEDICINE

## 2023-09-26 PROCEDURE — 3078F DIAST BP <80 MM HG: CPT | Performed by: INTERNAL MEDICINE

## 2023-09-26 NOTE — PROGRESS NOTES
Salvador Beltran  1945  [facility]      Chief Complaint   Patient presents with    Coronary Artery Disease    Hypertension    Hyperlipidemia    Follow-up     No chest pain, SOB dizziness, swelling or palpitations     Chief complaint and HPI:  Salvador Beltran  is a 77 y.o. male following up for known coronary artery disease status post bypass surgery in 2004 and has hypertension and hyperlipidemia and diabetes and diaphragmatic paralysis. He denies any cardiac symptoms. He has a been dieting to lose weight and feels better. He has been very compliant to medications and follows up with [facility] for prescriptions and lipid analysis. Rest of the Cardiovascular system review is otherwise unchanged from prior encounter. Past medical history:  has a past medical history of Anemia, Asthma, CAD (coronary artery disease), Cardiomyopathy ([address]), Cataract, COPD, mild ([address]), COVID-19 virus infection, Diabetes mellitus ([address]), Diabetic neuropathy ([address]), Diaphragmatic paralysis, H/O cardiovascular stress test, H/O cardiovascular stress test, H/O chest x-ray, H/O Doppler ultrasound, H/O echocardiogram, History of cardiac cath, Hyperlipidemia, Hypertension, Leg swelling, Median nerve compression, MI (myocardial infarction) ([address]), Mononeuritis multiplex, Retinal hemorrhage, S/P CABG x 4, Sleep apnea, and SOB (shortness of breath) on exertion. Past surgical history:  has a past surgical history that includes Carpal tunnel release; Finger trigger release; Cataract removal; Abdomen surgery; Appendectomy; and Coronary artery bypass graft (2004). Social History:   Social History     Tobacco Use    Smoking status: Never    Smokeless tobacco: Never   Substance Use Topics    Alcohol use: No     Alcohol/week: 0.0 standard drinks of alcohol     Family history: family history includes Cancer in his mother; Diabetes in his paternal aunt and sister.   ALLERGIES:  Lisinopril  Prior to Admission medications    Medication Sig Start

## 2023-09-26 NOTE — PATIENT INSTRUCTIONS
Continue current cardiovascular medications which have been reviewed and discussed individually with you. Continue regular aerobic exercises. Appropriate prescriptions if needed on this visit are addressed. After visit summery is provided. Primary/secondary prevention is the goal by aggressive risk modification, healthy and therapeutic life style changes for cardiovascular risk reduction. Various goals are discussed and questions answered. Questions answered and patient verbalizes understanding. Follow up in 6 months with PCP,  sooner if needed.

## 2024-03-27 RX ORDER — CARVEDILOL 12.5 MG/1
TABLET ORAL
Qty: 90 TABLET | Refills: 3 | Status: SHIPPED | OUTPATIENT
Start: 2024-03-27

## 2024-04-26 LAB
BUN BLDV-MCNC: 18 MG/DL
CALCIUM SERPL-MCNC: 9.3 MG/DL
CHLORIDE BLD-SCNC: 104 MMOL/L
CHOLESTEROL, TOTAL: 93 MG/DL
CHOLESTEROL/HDL RATIO: 93
CO2: 26 MMOL/L
CREAT SERPL-MCNC: 1.2 MG/DL
EGFR: 63
GLUCOSE BLD-MCNC: 143 MG/DL
HDLC SERPL-MCNC: 42 MG/DL (ref 35–70)
LDL CHOLESTEROL CALCULATED: 36 MG/DL (ref 0–160)
NONHDLC SERPL-MCNC: NORMAL MG/DL
POTASSIUM SERPL-SCNC: 4.7 MMOL/L
SODIUM BLD-SCNC: 138 MMOL/L
TRIGL SERPL-MCNC: 72 MG/DL
VLDLC SERPL CALC-MCNC: NORMAL MG/DL

## 2024-04-29 ENCOUNTER — OFFICE VISIT (OUTPATIENT)
Dept: CARDIOLOGY CLINIC | Age: 79
End: 2024-04-29
Payer: COMMERCIAL

## 2024-04-29 VITALS
DIASTOLIC BLOOD PRESSURE: 56 MMHG | BODY MASS INDEX: 27.49 KG/M2 | HEIGHT: 68 IN | HEART RATE: 63 BPM | SYSTOLIC BLOOD PRESSURE: 118 MMHG | WEIGHT: 181.4 LBS

## 2024-04-29 DIAGNOSIS — E78.00 PURE HYPERCHOLESTEROLEMIA: ICD-10-CM

## 2024-04-29 DIAGNOSIS — Z95.1 S/P CABG (CORONARY ARTERY BYPASS GRAFT): Primary | ICD-10-CM

## 2024-04-29 DIAGNOSIS — E08.00 DIABETES MELLITUS DUE TO UNDERLYING CONDITION WITH HYPEROSMOLARITY WITHOUT COMA, WITHOUT LONG-TERM CURRENT USE OF INSULIN (HCC): ICD-10-CM

## 2024-04-29 DIAGNOSIS — I10 ESSENTIAL HYPERTENSION: ICD-10-CM

## 2024-04-29 PROCEDURE — 99214 OFFICE O/P EST MOD 30 MIN: CPT | Performed by: INTERNAL MEDICINE

## 2024-04-29 PROCEDURE — 93000 ELECTROCARDIOGRAM COMPLETE: CPT | Performed by: INTERNAL MEDICINE

## 2024-04-29 PROCEDURE — 3074F SYST BP LT 130 MM HG: CPT | Performed by: INTERNAL MEDICINE

## 2024-04-29 PROCEDURE — 3078F DIAST BP <80 MM HG: CPT | Performed by: INTERNAL MEDICINE

## 2024-04-29 PROCEDURE — 1123F ACP DISCUSS/DSCN MKR DOCD: CPT | Performed by: INTERNAL MEDICINE

## 2024-04-29 RX ORDER — LOSARTAN POTASSIUM 25 MG/1
25 TABLET ORAL DAILY
Qty: 90 TABLET | Refills: 3 | Status: SHIPPED | OUTPATIENT
Start: 2024-04-29

## 2024-04-29 NOTE — PROGRESS NOTES
Van Maradiaga  1945  [place], [facility]      Chief Complaint   Patient presents with    Coronary Artery Disease    Hypertension    Hyperlipidemia    Follow-up     Denies any chest pains, SOB, swelling or palpitations  2 falls over last year     Chief complaint and HPI:  Van Maradiaga  is a 78 y.o. male following up for known coronary artery disease and hyperlipidemia and has insulin-dependent diabetes mellitus and hypertension.  Denies any new cardiac symptoms.  He had fallen twice last his balance when he turned around.  He also has dizziness for which she is going to see specialist for physical therapy.  He is walking with a walker and has lost weight and otherwise feeling good.  Denies any chest pains or shortness of breath.  [facility] forgot to bring the results and he has been compliant to medications.    Rest of the Cardiovascular system review is otherwise unchanged from prior encounter.  Past medical history:  has a past medical history of Anemia, Asthma, CAD (coronary artery disease), Cardiomyopathy (HCC), Cataract, COPD, mild (HCC), COVID-19 virus infection, Diabetes mellitus (HCC), Diabetic neuropathy (HCC), Diaphragmatic paralysis, H/O cardiovascular stress test, H/O cardiovascular stress test, H/O chest x-ray, H/O Doppler ultrasound, H/O echocardiogram, History of cardiac cath, Hyperlipidemia, Hypertension, Leg swelling, Median nerve compression, MI (myocardial infarction) (HCC), Mononeuritis multiplex, Retinal hemorrhage, S/P CABG x 4, Sleep apnea, and SOB (shortness of breath) on exertion.  Past surgical history:  has a past surgical history that includes Carpal tunnel release; Finger trigger release; Cataract removal; Abdomen surgery; Appendectomy; and Coronary artery bypass graft (2004).  Social History:   Social History     Tobacco Use    Smoking status: Never    Smokeless tobacco: Never   Substance Use Topics    Alcohol use: No

## 2024-04-29 NOTE — PATIENT INSTRUCTIONS
Continue current cardiovascular medications which have been reviewed and discussed individually with you. Continue secondary prevention  by aggressive risk modification, healthy and therapeutic life style changes for cardiovascular risk reduction. Various goals are discussed and questions answered.  Appropriate prescriptions if needed on this visit are addressed. After visit summery is provided.   Questions answered and patient verbalizes understanding. Follow up in 6 months,  sooner if needed.

## 2024-04-29 NOTE — ASSESSMENT & PLAN NOTE
[facility] and he was told his numbers look good.  Continue simvastatin 20 mg daily.

## 2024-04-29 NOTE — ASSESSMENT & PLAN NOTE
He reports his recent hemoglobin A1c was around 7 and he had been cutting back on his insulin and followed [facility] closely.

## 2024-06-03 ENCOUNTER — OFFICE VISIT (OUTPATIENT)
Dept: PULMONOLOGY | Age: 79
End: 2024-06-03
Payer: COMMERCIAL

## 2024-06-03 VITALS
BODY MASS INDEX: 28.04 KG/M2 | OXYGEN SATURATION: 99 % | SYSTOLIC BLOOD PRESSURE: 136 MMHG | HEART RATE: 43 BPM | HEIGHT: 68 IN | DIASTOLIC BLOOD PRESSURE: 66 MMHG | WEIGHT: 185 LBS

## 2024-06-03 DIAGNOSIS — G47.33 OBSTRUCTIVE SLEEP APNEA SYNDROME: ICD-10-CM

## 2024-06-03 DIAGNOSIS — J45.20 MILD INTERMITTENT ASTHMA WITHOUT COMPLICATION: ICD-10-CM

## 2024-06-03 DIAGNOSIS — J44.9 COPD, MILD (HCC): Primary | ICD-10-CM

## 2024-06-03 DIAGNOSIS — R06.02 SOB (SHORTNESS OF BREATH) ON EXERTION: ICD-10-CM

## 2024-06-03 PROCEDURE — 3075F SYST BP GE 130 - 139MM HG: CPT | Performed by: INTERNAL MEDICINE

## 2024-06-03 PROCEDURE — 3078F DIAST BP <80 MM HG: CPT | Performed by: INTERNAL MEDICINE

## 2024-06-03 PROCEDURE — 1123F ACP DISCUSS/DSCN MKR DOCD: CPT | Performed by: INTERNAL MEDICINE

## 2024-06-03 PROCEDURE — 99213 OFFICE O/P EST LOW 20 MIN: CPT | Performed by: INTERNAL MEDICINE

## 2024-06-03 NOTE — PROGRESS NOTES
of breath) on exertion          PLAN:  I am going to change him from a nasal mask to a full facemask and have him come back in 4 months to repeat his compliance study.  I did encourage him to continue wearing BiPAP every night.  I will make no change in his current bronchodilator therapy and he is up-to-date with that.  [facility] will continue to follow him    We have discussed the need to maintain yearly flu immunization, pneumococcal vaccination. We have discussed Coronavirus precaution including handwashing practice, wiping items touched in public such as gas pumps, door handles, shopping carts, etc. Self monitoring for infection - fever, chills, cough, SOB. Should they develop symptoms they should call office for further instructions.    Return in about 4 months (around 10/3/2024) for Recheck for COPD, Recheck for Asthma, Recheck for Obstructive Sleep Apnea.      This dictation was performed with a verbal recognition program and it was checked for errors.  It is possible that there are still dictated errors within this office note.  Any errors should be brought immediately to my attention for correction.  All efforts were made to ensure that this office note is accurate.

## 2024-10-07 ENCOUNTER — OFFICE VISIT (OUTPATIENT)
Dept: PULMONOLOGY | Age: 79
End: 2024-10-07
Payer: COMMERCIAL

## 2024-10-07 VITALS
OXYGEN SATURATION: 99 % | WEIGHT: 179 LBS | SYSTOLIC BLOOD PRESSURE: 122 MMHG | HEIGHT: 68 IN | BODY MASS INDEX: 27.13 KG/M2 | DIASTOLIC BLOOD PRESSURE: 58 MMHG | HEART RATE: 67 BPM

## 2024-10-07 DIAGNOSIS — J45.20 MILD INTERMITTENT ASTHMA WITHOUT COMPLICATION: ICD-10-CM

## 2024-10-07 DIAGNOSIS — G47.33 OBSTRUCTIVE SLEEP APNEA SYNDROME: Primary | ICD-10-CM

## 2024-10-07 DIAGNOSIS — J44.9 COPD, MILD (HCC): ICD-10-CM

## 2024-10-07 DIAGNOSIS — R06.02 SOB (SHORTNESS OF BREATH) ON EXERTION: ICD-10-CM

## 2024-10-07 PROCEDURE — 99213 OFFICE O/P EST LOW 20 MIN: CPT | Performed by: INTERNAL MEDICINE

## 2024-10-07 PROCEDURE — 3078F DIAST BP <80 MM HG: CPT | Performed by: INTERNAL MEDICINE

## 2024-10-07 PROCEDURE — 1123F ACP DISCUSS/DSCN MKR DOCD: CPT | Performed by: INTERNAL MEDICINE

## 2024-10-07 PROCEDURE — 3074F SYST BP LT 130 MM HG: CPT | Performed by: INTERNAL MEDICINE

## 2024-10-07 NOTE — PROGRESS NOTES
SUBJECTIVE:  Chief Complaint: Minimal COPD, mild intermittent asthma, obstructive sleep apnea on BiPAP, dyspnea on exertion  Rocky states that he is not experiencing worsening shortness of breath but he still has balance issues following his fall earlier this year.  He has an albuterol rescue inhaler but rarely has to use it for his mild intermittent asthma.  He denies chest pain or chest discomfort.  He continues to wear BiPAP 14/4 around 6 to 7 hours per night and continues to get a good clinical result with improved daytime energy and less daytime sleepiness.  He mentions that he did not get a different facemask but instead decided to start sleeping on his right side and he noted that his AHI improved significantly.  I did review his most recent compliance report from July 24 to October 24 and noted that his AHI had dropped from 8.0-3.7 with a minimal leak and average usage of 6 hours and 42 minutes.      ROS:  Constitution:  HEENT: Negative for ear, throat pain  Cardiovascular: Negative for chest pain, syncope, edema  Pulmonary: See HPI  Musculoskeletal: Negative for DVT, myalgias, arthralgias    OBJECTIVE:  BP (!) 122/58   Pulse 67   Ht 1.727 m (5' 8\")   Wt 81.2 kg (179 lb)   SpO2 99%   BMI 27.22 kg/m²      Physical Exam:  Constitutional:  He appears well developed and well-nourished.  In no respiratory distress at rest.  No evidence of obesity  Neck:  Supple, No palpable lymphadenopathy, No JVD  Cardiovascular:  S1, S2 Normal, Regular rhythm, no murmurs or gallops, No pericardial  rubs.  Pulmonary: Breath sounds are clear throughout all areas without wheezing or rhonchi  Abdomen: Not examined  Extremities: no edema, No DVT  Neurologic: Oriented x3, No focal deficits    Radiology: No recent chest x-ray available  PFT: Office spirometry on 9/16/2021 demonstrated a minimal obstructive defect with no significant response to bronchodilators      Echocardiogram: No echo available    ASSESSMENT:    1. Obstructive

## 2024-10-29 ENCOUNTER — OFFICE VISIT (OUTPATIENT)
Dept: CARDIOLOGY CLINIC | Age: 79
End: 2024-10-29
Payer: COMMERCIAL

## 2024-10-29 VITALS
WEIGHT: 177.8 LBS | SYSTOLIC BLOOD PRESSURE: 120 MMHG | HEART RATE: 68 BPM | HEIGHT: 68 IN | BODY MASS INDEX: 26.95 KG/M2 | DIASTOLIC BLOOD PRESSURE: 62 MMHG

## 2024-10-29 DIAGNOSIS — E66.811 OBESITY (BMI 30.0-34.9): ICD-10-CM

## 2024-10-29 DIAGNOSIS — G47.33 OBSTRUCTIVE SLEEP APNEA SYNDROME: ICD-10-CM

## 2024-10-29 DIAGNOSIS — E08.00 DIABETES MELLITUS DUE TO UNDERLYING CONDITION WITH HYPEROSMOLARITY WITHOUT COMA, WITHOUT LONG-TERM CURRENT USE OF INSULIN (HCC): ICD-10-CM

## 2024-10-29 DIAGNOSIS — Z95.1 S/P CABG (CORONARY ARTERY BYPASS GRAFT): Primary | ICD-10-CM

## 2024-10-29 DIAGNOSIS — I10 ESSENTIAL HYPERTENSION: ICD-10-CM

## 2024-10-29 DIAGNOSIS — M79.89 LEG SWELLING: ICD-10-CM

## 2024-10-29 DIAGNOSIS — E78.00 PURE HYPERCHOLESTEROLEMIA: ICD-10-CM

## 2024-10-29 PROCEDURE — 3078F DIAST BP <80 MM HG: CPT | Performed by: INTERNAL MEDICINE

## 2024-10-29 PROCEDURE — 1123F ACP DISCUSS/DSCN MKR DOCD: CPT | Performed by: INTERNAL MEDICINE

## 2024-10-29 PROCEDURE — 99214 OFFICE O/P EST MOD 30 MIN: CPT | Performed by: INTERNAL MEDICINE

## 2024-10-29 PROCEDURE — 3074F SYST BP LT 130 MM HG: CPT | Performed by: INTERNAL MEDICINE

## 2024-10-29 RX ORDER — CARVEDILOL 12.5 MG/1
12.5 TABLET ORAL 2 TIMES DAILY
Qty: 90 TABLET | Refills: 3 | Status: SHIPPED | OUTPATIENT
Start: 2024-10-29

## 2024-10-29 RX ORDER — LOSARTAN POTASSIUM 25 MG/1
25 TABLET ORAL DAILY
Qty: 90 TABLET | Refills: 3 | Status: SHIPPED | OUTPATIENT
Start: 2024-10-29

## 2024-10-29 NOTE — ASSESSMENT & PLAN NOTE
Clinically stable.  Continue healthy lifestyles and risk factor modification for secondary prevention.  Last stress test was in 2019.

## 2024-10-29 NOTE — ASSESSMENT & PLAN NOTE
Patient is trying to lose weight and lost 4 pounds since last visit.  His goal is to get a BMI down to 25.

## 2024-10-29 NOTE — ASSESSMENT & PLAN NOTE
This is been chronic and stable.  Continue elevating feet when resting and use compression stockings if he has to be on his feet for long time.

## 2024-10-29 NOTE — PROGRESS NOTES
Assessment/Plan:     No changes to his medications today. He will continue to follow-up with research per protocol for PROMINENT study.    Subjective:     Man JOSEPH Acevedo Sr. is seen at [facility] today for PROMINENT study, visit 5. Pemafibrate to Reduce cardiovascular OutcoMes by reducing triglycerides IN patiENts with diabeTes (PROMINENT) clinical trial.  He has a history of coronary artery disease with previous MI and stents, hypertension, type 2 diabetes, and hyperlipidemia.  His wife  unexpectedly last month after which he had some issues with significant hypertension; he was started on chlorthalidone and blood pressures have been much improved.    He states that he is feeling well and has no concerns today.  He denies fatigue, lightheadedness, shortness of breath, dyspnea on exertion, orthopnea, PND, palpitations, chest pain, abdominal fullness/bloating and lower extremity edema.      Patient Active Problem List   Diagnosis     Peripheral Neuropathy     Nonalcoholic Fatty Liver Disease     Lower Back Pain     Adjustment Disorder With Anxiety And Depressed Mood     Neck Pain     Dyslipidemia     Uncontrolled type 2 diabetes mellitus with other diabetic kidney complication, unspecified long term insulin use status     Pain in joint, ankle and foot     Obstructive sleep apnea     Right carpal tunnel syndrome     Left hand weakness     Tennis elbow     Benign essential hypertension     Obesity due to excess calories     Coronary artery disease due to lipid rich plaque     NSTEMI (non-ST elevated myocardial infarction)     Tobacco use       Past Medical History:   Diagnosis Date     Coronary artery disease      Diabetes mellitus 2005    adult onset     GERD (gastroesophageal reflux disease)      High cholesterol      Hyperlipidemia      Hypertension      Kidney stones      Liver disease      Myocardial infarction      Neuropathy of foot      Obesity      DEWAYNE on CPAP      Peripheral Neuropathy      Van Maradiaga  1945  [place], [facility]      Chief Complaint   Patient presents with    Coronary Artery Disease    Hypertension    Hyperlipidemia    Follow-up     6 month follow up  Denies any chest pain, SOB dizziness, or palpitations  Pt stated swelling is better      Chief complaint and HPI:  Van Maradiaga  is a 79 y.o. male following up for known coronary artery disease and hyperlipidemia and has diabetes and obstructive sleep apnea.  Denies any new cardiac symptoms.  Has chronic leg swelling also which has been stable.  He has lost 4 pounds since last visit 6 months ago and he is trying to lose more weight.  [facility] has been [facility] to [facility].  Medications are reviewed and reconciled.    Rest of the Cardiovascular system review is otherwise unchanged from prior encounter.  Past medical history:  has a past medical history of Anemia, Asthma, CAD (coronary artery disease), Cardiomyopathy (HCC), Cataract, COPD, mild (HCC), COVID-19 virus infection, Diabetes mellitus (HCC), Diabetic neuropathy (HCC), Diaphragmatic paralysis, H/O cardiovascular stress test, H/O cardiovascular stress test, H/O chest x-ray, H/O Doppler ultrasound, H/O echocardiogram, History of cardiac cath, Hyperlipidemia, Hypertension, Leg swelling, Median nerve compression, MI (myocardial infarction) ([facility]), Mild intermittent asthma without complication, Mononeuritis multiplex, Retinal hemorrhage, S/P CABG x 4, Sleep apnea, and SOB (shortness of breath) on exertion.  Past surgical history:  has a past surgical history that includes Carpal tunnel release; Finger trigger release; Cataract removal; Abdomen surgery; Appendectomy; and Coronary artery bypass graft (2004).  Social History:   Social History     Tobacco Use    Smoking status: Never    Smokeless tobacco: Never   Substance Use Topics    Alcohol use: No     Alcohol/week: 0.0 standard drinks of alcohol     Family history: family history includes Cancer in  "Created by Conversion      Sleep apnea        Past Surgical History:   Procedure Laterality Date     CARDIAC CATHETERIZATION       CARPAL TUNNEL RELEASE Right 04/12/2016     CORONARY STENT PLACEMENT       KIDNEY STONE SURGERY       KNEE ARTHROSCOPY Left 8/6/15    Partial medial meniscectoy, foreign body removal[facility]     LEG SURGERY       KY AMPUTATE METACARPAL+FINGER      Description: Metacarpal Amputation And Index Finger;  Recorded: 08/15/2014;  Comments: For \"cartilage cancer\"     KY CATH PLACEMENT & NJX CORONARY ART ANGIO IMG S&I N/A 1/24/2017    Procedure: Coronary Angiogram;  Surgeon: Melissa Freeman MD;  Location: [facility] Cath Lab;  Service: Cardiology     KY L HRT CATH W/NJX L VENTRICULOGRAPHY IMG S&I N/A 1/24/2017    Procedure: Left Heart Catheterization with Left Ventriculogram;  Surgeon: Melissa Freeman MD;  Location: [facility] Cath Lab;  Service: Cardiology     KY LAP,CHOLECYSTECTOMY      Description: Cholecystectomy Laparoscopic;  Proc Date: 12/11/2013;     KY PERCUT REMV KID STONE,UP TO 2 CM      Description: Percutaneous Lithotomy;  Proc Date: 11/05/2012;       History reviewed. No pertinent family history.    Social History     Social History     Marital status:      Spouse name: N/A     Number of children: N/A     Years of education: 13     Occupational History     Not on file.     Social History Main Topics     Smoking status: Former Smoker     Packs/day: 0.50     Smokeless tobacco: Never Used      Comment: Patient reports he is trying to quit.     Alcohol use No     Drug use: No     Sexual activity: Not on file     Other Topics Concern     Not on file     Social History Narrative    Wife of 21 years passed away on 10/15/2017. Had 8 kids with her - 3 of hers from a previous marriage, 3 of his from a previous marriage, and 2 kids they adopted together. He has 20 grandchildren.        Current Outpatient Prescriptions   Medication Sig Dispense Refill     aspirin 81 MG " EC tablet Take 81 mg by mouth daily.       atorvastatin (LIPITOR) 80 MG tablet Take 1 tablet (80 mg total) by mouth daily. 30 tablet 1     chlorthalidone (HYGROTEN) 25 MG tablet Take 12.5 mg by mouth daily.       cholecalciferol, vitamin D3, 1,000 unit tablet Take 1,000 Units by mouth daily.       clopidogrel (PLAVIX) 75 mg tablet Take 1 tablet (75 mg total) by mouth daily.  0     cyanocobalamin 1000 MCG tablet Take 1,000 mcg by mouth daily.       hydrOXYzine (ATARAX) 25 MG tablet Take 1 tablet (25 mg total) by mouth every 6 (six) hours as needed for anxiety. 15 tablet 0     insulin aspart (NOVOLOG) 100 unit/mL injection Inject 35 Units under the skin 3 (three) times a day before meals.        insulin glargine (LANTUS) 100 unit/mL injection Inject 56 Units under the skin 2 (two) times a day.        liraglutide (VICTOZA) 0.6 mg/0.1 mL (18 mg/3 mL) PnIj injection Inject 1.8 mg under the skin daily.        lisinopril (PRINIVIL,ZESTRIL) 40 MG tablet Take 40 mg by mouth daily.       metFORMIN (GLUCOPHAGE) 1000 MG tablet Take 1 tablet (1,000 mg total) by mouth 2 (two) times a day with meals. 60 tablet 6     metoprolol succinate (TOPROL XL) 25 MG Take 1 tablet (25 mg total) by mouth daily. 30 tablet 0     minocycline (MINOCIN,DYNACIN) 100 MG capsule Take 100 mg by mouth 2 (two) times a day as needed.        potassium citrate (UROCIT-K) 5 mEq (540 mg) SR tablet Take 30 mEq by mouth 2 (two) times a day.        syringe accessory Misc Inject 3 each as directed.       tamsulosin (FLOMAX) 0.4 mg Cp24 Take 1 capsule (0.4 mg total) by mouth daily. 14 capsule 0     Current Facility-Administered Medications   Medication Dose Route Frequency Provider Last Rate Last Dose     Study Drug pemafibrate 0.2 mg/placebo tablet 0.2 mg (PROMINENT)  0.2 mg Oral BID Yeny Barber MD         Study Drug pemafibrate 0.2 mg/placebo tablet 0.2 mg (PROMINENT)  0.2 mg Oral BID Yeny Barber MD         Study Drug pemafibrate 0.2 mg/placebo tablet  0.2 mg (PROMINENT)  0.2 mg Oral BID Presley Torres RN           Allergies   Allergen Reactions     Gabapentin      Tolmetin Unknown     Pt is on Plavix       Objective:     Vitals:    11/17/17 0935   BP: (!) 140/94   Pulse: 80   Resp: 16     Wt Readings from Last 3 Encounters:   11/17/17 (!) 245 lb (111.1 kg)   10/26/17 (!) 245 lb (111.1 kg)   10/15/17 (!) 248 lb (112.5 kg)       General Appearance:   Alert, cooperative and in no acute distress.   HEENT:  No scleral icterus, EOM intact, PERRL; the mucous membranes were pink and moist. Cervical lymph nodes nontender and nonpalpable.   Neck: Supple.  JVP normal.  No HJR.  No obvious thyromegaly.     Chest: The spine was straight. The chest was symmetric.   Lungs:   Respirations unlabored; the lungs are clear to auscultation.   Cardiovascular:   Regular rhythm. S1 and S2 without murmur, clicks or rubs. Radial, carotid and posterior tibial pulses are intact and symmetrical.  No carotid bruits noted.   Abdomen:  Soft, nontender, nondistended, bowel sounds present   Extremities: No cyanosis, clubbing or edema.   Musculoskeletal:  Moves all extremities.   Skin: No bruising or wounds.   Neurologic: Mood and affect are appropriate.             Julia Marie CNP    This note has been dictated using voice recognition software. Any grammatical, typographical, or context distortions are unintentional and inherent to the software.

## 2024-12-06 RX ORDER — ALBUTEROL SULFATE 90 UG/1
2 POWDER, METERED RESPIRATORY (INHALATION) EVERY 4 HOURS PRN
Qty: 1 EACH | Refills: 11 | Status: SHIPPED | OUTPATIENT
Start: 2024-12-06

## 2025-04-30 RX ORDER — CARVEDILOL 12.5 MG/1
12.5 TABLET ORAL 2 TIMES DAILY
Qty: 180 TABLET | Refills: 3 | Status: SHIPPED | OUTPATIENT
Start: 2025-04-30

## 2025-05-22 LAB
ALBUMIN: 3.6 G/DL
ALP BLD-CCNC: 56 U/L
ALT SERPL-CCNC: 16 U/L
ANION GAP SERPL CALCULATED.3IONS-SCNC: 7 MMOL/L
AST SERPL-CCNC: 16 U/L
BASOPHILS ABSOLUTE: ABNORMAL
BASOPHILS RELATIVE PERCENT: ABNORMAL
BILIRUB SERPL-MCNC: 0.5 MG/DL (ref 0.1–1.4)
BUN BLDV-MCNC: NORMAL MG/DL
CALCIUM SERPL-MCNC: 9.1 MG/DL
CHLORIDE BLD-SCNC: 105 MMOL/L
CO2: 7 MMOL/L
CREAT SERPL-MCNC: 0.9 MG/DL
EOSINOPHILS ABSOLUTE: ABNORMAL
EOSINOPHILS RELATIVE PERCENT: ABNORMAL
GFR, ESTIMATED: 86
GLUCOSE BLD-MCNC: 132 MG/DL
HCT VFR BLD CALC: 31.3 % (ref 41–53)
HEMOGLOBIN: 10.7 G/DL (ref 13.5–17.5)
LYMPHOCYTES ABSOLUTE: ABNORMAL
LYMPHOCYTES RELATIVE PERCENT: ABNORMAL
MCH RBC QN AUTO: ABNORMAL PG
MCHC RBC AUTO-ENTMCNC: 33.8 G/DL
MCV RBC AUTO: 98.7 FL
MONOCYTES ABSOLUTE: ABNORMAL
MONOCYTES RELATIVE PERCENT: ABNORMAL
NEUTROPHILS ABSOLUTE: ABNORMAL
NEUTROPHILS RELATIVE PERCENT: ABNORMAL
PLATELET # BLD: 166 K/ΜL
PMV BLD AUTO: 10.8 FL
POTASSIUM SERPL-SCNC: 4.7 MMOL/L
RBC # BLD: 3.17 10^6/ΜL
SODIUM BLD-SCNC: 4.7 MMOL/L
TOTAL PROTEIN: 6.9 G/DL (ref 6.4–8.2)
VITAMIN D 25-HYDROXY: 22
VITAMIN D2, 25 HYDROXY: NORMAL
VITAMIN D3,25 HYDROXY: NORMAL
WBC # BLD: 7.9 10^3/ML